# Patient Record
Sex: FEMALE | Race: WHITE | NOT HISPANIC OR LATINO | Employment: OTHER | URBAN - METROPOLITAN AREA
[De-identification: names, ages, dates, MRNs, and addresses within clinical notes are randomized per-mention and may not be internally consistent; named-entity substitution may affect disease eponyms.]

---

## 2020-06-22 PROBLEM — I10 ESSENTIAL HYPERTENSION: Status: ACTIVE | Noted: 2019-03-06

## 2021-12-02 PROBLEM — K21.9 GASTROESOPHAGEAL REFLUX DISEASE WITHOUT ESOPHAGITIS: Status: ACTIVE | Noted: 2021-12-02

## 2021-12-02 PROBLEM — K44.9 HIATAL HERNIA: Status: ACTIVE | Noted: 2021-12-02

## 2021-12-02 PROBLEM — J45.909 CHRONIC ASTHMA WITHOUT COMPLICATION: Status: ACTIVE | Noted: 2021-12-02

## 2023-10-30 PROBLEM — I10 ESSENTIAL HYPERTENSION: Status: RESOLVED | Noted: 2019-03-06 | Resolved: 2023-10-30

## 2023-10-30 PROBLEM — K44.9 HIATAL HERNIA: Status: RESOLVED | Noted: 2021-12-02 | Resolved: 2023-10-30

## 2023-10-30 PROBLEM — E78.5 HYPERLIPIDEMIA: Status: ACTIVE | Noted: 2023-10-30

## 2023-10-30 PROBLEM — M81.0 OSTEOPOROSIS: Status: ACTIVE | Noted: 2023-10-30

## 2024-03-06 ENCOUNTER — OFFICE VISIT (OUTPATIENT)
Dept: RHEUMATOLOGY | Facility: CLINIC | Age: 69
End: 2024-03-06
Payer: MEDICARE

## 2024-03-06 ENCOUNTER — LAB VISIT (OUTPATIENT)
Dept: LAB | Facility: HOSPITAL | Age: 69
End: 2024-03-06
Attending: STUDENT IN AN ORGANIZED HEALTH CARE EDUCATION/TRAINING PROGRAM
Payer: MEDICARE

## 2024-03-06 VITALS
SYSTOLIC BLOOD PRESSURE: 145 MMHG | HEIGHT: 62 IN | BODY MASS INDEX: 28.61 KG/M2 | HEART RATE: 88 BPM | DIASTOLIC BLOOD PRESSURE: 86 MMHG | WEIGHT: 155.44 LBS

## 2024-03-06 DIAGNOSIS — M81.0 OSTEOPOROSIS, UNSPECIFIED OSTEOPOROSIS TYPE, UNSPECIFIED PATHOLOGICAL FRACTURE PRESENCE: Primary | ICD-10-CM

## 2024-03-06 DIAGNOSIS — M81.0 OSTEOPOROSIS, UNSPECIFIED OSTEOPOROSIS TYPE, UNSPECIFIED PATHOLOGICAL FRACTURE PRESENCE: ICD-10-CM

## 2024-03-06 LAB
25(OH)D3+25(OH)D2 SERPL-MCNC: 32 NG/ML (ref 30–96)
ALBUMIN SERPL BCP-MCNC: 2.9 G/DL (ref 3.5–5.2)
ALP SERPL-CCNC: 174 U/L (ref 55–135)
ALT SERPL W/O P-5'-P-CCNC: 22 U/L (ref 10–44)
ANION GAP SERPL CALC-SCNC: 6 MMOL/L (ref 8–16)
AST SERPL-CCNC: 29 U/L (ref 10–40)
BILIRUB SERPL-MCNC: 0.3 MG/DL (ref 0.1–1)
BUN SERPL-MCNC: 17 MG/DL (ref 8–23)
CALCIUM SERPL-MCNC: 8.7 MG/DL (ref 8.7–10.5)
CHLORIDE SERPL-SCNC: 101 MMOL/L (ref 95–110)
CO2 SERPL-SCNC: 28 MMOL/L (ref 23–29)
CREAT SERPL-MCNC: 1.1 MG/DL (ref 0.5–1.4)
EST. GFR  (NO RACE VARIABLE): 54 ML/MIN/1.73 M^2
GLUCOSE SERPL-MCNC: 98 MG/DL (ref 70–110)
POTASSIUM SERPL-SCNC: 4.8 MMOL/L (ref 3.5–5.1)
PROT SERPL-MCNC: 8.2 G/DL (ref 6–8.4)
SODIUM SERPL-SCNC: 135 MMOL/L (ref 136–145)

## 2024-03-06 PROCEDURE — 99205 OFFICE O/P NEW HI 60 MIN: CPT | Mod: S$PBB,,, | Performed by: STUDENT IN AN ORGANIZED HEALTH CARE EDUCATION/TRAINING PROGRAM

## 2024-03-06 PROCEDURE — 99999 PR PBB SHADOW E&M-EST. PATIENT-LVL IV: CPT | Mod: PBBFAC,,, | Performed by: STUDENT IN AN ORGANIZED HEALTH CARE EDUCATION/TRAINING PROGRAM

## 2024-03-06 PROCEDURE — 82306 VITAMIN D 25 HYDROXY: CPT | Performed by: STUDENT IN AN ORGANIZED HEALTH CARE EDUCATION/TRAINING PROGRAM

## 2024-03-06 PROCEDURE — 80053 COMPREHEN METABOLIC PANEL: CPT | Performed by: STUDENT IN AN ORGANIZED HEALTH CARE EDUCATION/TRAINING PROGRAM

## 2024-03-06 PROCEDURE — 99214 OFFICE O/P EST MOD 30 MIN: CPT | Mod: PBBFAC | Performed by: STUDENT IN AN ORGANIZED HEALTH CARE EDUCATION/TRAINING PROGRAM

## 2024-03-06 PROCEDURE — 36415 COLL VENOUS BLD VENIPUNCTURE: CPT | Performed by: STUDENT IN AN ORGANIZED HEALTH CARE EDUCATION/TRAINING PROGRAM

## 2024-03-06 RX ORDER — PREGABALIN 25 MG/1
25 CAPSULE ORAL 2 TIMES DAILY
COMMUNITY

## 2024-03-06 RX ORDER — PANTOPRAZOLE SODIUM 40 MG/1
40 TABLET, DELAYED RELEASE ORAL
COMMUNITY
Start: 2024-02-20 | End: 2024-05-20

## 2024-03-07 NOTE — PROGRESS NOTES
CMP does show some decline in kidney function from previous.  Otherwise stable.  Vitamin-D is normal.

## 2024-03-08 RX ORDER — ACETAMINOPHEN 325 MG/1
650 TABLET ORAL
Status: CANCELLED | OUTPATIENT
Start: 2024-03-08

## 2024-03-08 RX ORDER — ZOLEDRONIC ACID 5 MG/100ML
5 INJECTION, SOLUTION INTRAVENOUS
Status: CANCELLED | OUTPATIENT
Start: 2024-03-08

## 2024-03-08 RX ORDER — HEPARIN 100 UNIT/ML
500 SYRINGE INTRAVENOUS
Status: CANCELLED | OUTPATIENT
Start: 2024-03-08

## 2024-03-08 RX ORDER — SODIUM CHLORIDE 0.9 % (FLUSH) 0.9 %
10 SYRINGE (ML) INJECTION
Status: CANCELLED | OUTPATIENT
Start: 2024-03-08

## 2024-03-27 ENCOUNTER — TELEPHONE (OUTPATIENT)
Dept: INFUSION THERAPY | Facility: HOSPITAL | Age: 69
End: 2024-03-27
Payer: MEDICARE

## 2024-03-28 ENCOUNTER — TELEPHONE (OUTPATIENT)
Dept: RHEUMATOLOGY | Facility: CLINIC | Age: 69
End: 2024-03-28
Payer: MEDICARE

## 2024-03-28 NOTE — TELEPHONE ENCOUNTER
Discussed reclast infusion protocol. Pt scheduled per her request for labs on 5/2 and infusion on 5/09.

## 2024-03-28 NOTE — TELEPHONE ENCOUNTER
----- Message from Ember Castaneda MA sent at 3/27/2024  3:15 PM CDT -----  Contact: Fe    ----- Message -----  From: Lillie Alfredo  Sent: 3/27/2024   3:05 PM CDT  To: Walter [facility] Staff    Type:  Patient Returning Call    Who Called Fe  Who Left Message for Patient:PRANAV MARTINEZ  Does the patient know what this is regarding?:Appointment   Would the patient rather a call back or a response via [facility]? Call back  Best Call Back Number:[phone]  Additional Information:

## 2024-04-03 ENCOUNTER — TELEPHONE (OUTPATIENT)
Dept: RHEUMATOLOGY | Facility: CLINIC | Age: 69
End: 2024-04-03
Payer: MEDICARE

## 2024-04-03 ENCOUNTER — PATIENT MESSAGE (OUTPATIENT)
Dept: INFUSION THERAPY | Facility: HOSPITAL | Age: 69
End: 2024-04-03
Payer: MEDICARE

## 2024-04-03 DIAGNOSIS — M81.0 OSTEOPOROSIS, UNSPECIFIED OSTEOPOROSIS TYPE, UNSPECIFIED PATHOLOGICAL FRACTURE PRESENCE: Primary | ICD-10-CM

## 2024-04-03 NOTE — TELEPHONE ENCOUNTER
----- Message from Tigist Gore RN sent at 4/3/2024 12:54 PM CDT -----  Regarding: Dexa  Hi Dr Corrales,   You saw this new pt on 3/06. I called to schedule her reclast infusion and she asked when she will have a dexa scan done. I didn't see an order. Pt says it has been > 5yrs since her last.  She is scheduled for a cmp on 5/2 and reclast on 5/09 per her request. Looks like your progress note is still open. I didn't know if you were waiting on Medical  documents. Are you ready for me to schedule her infusion or do I need to wait? thanks

## 2024-04-08 NOTE — PROGRESS NOTES
RHEUMATOLOGY CLINIC INITIAL VISIT    Reason for consult:- osteoporosis    Chief complaints, HPI, ROS, EXAM, Assessment & Plans:-    Fe Irene is a 68 y.o. pleasant female who presents to be evaluated for management of osteoporosis.  She has had multiple fragility fractures of her tibia and fibula in the past.  She is taking vitamin-D.  No plans for invasive dental work.  Rheumatologic review of systems otherwise negative.  Physical exam is unremarkable.    Reviewed all available old and outside pertinent medical records.    All lab results personally reviewed and interpreted by me.    1. Osteoporosis, unspecified osteoporosis type, unspecified pathological fracture presence        Problem List Items Addressed This Visit          Endocrine    Osteoporosis - Primary    Relevant Orders    Comprehensive Metabolic Panel    Vitamin D       Patient presenting to be evaluated for treatment of osteoporosis  DEXA scan  showed osteoporosis with a T-score of -2.9 at the left femoral neck  In light of this and her fragility fractures she is very high risk for fracture  Therefore we will plan to treat with Reclast yearly  Update DEXA scan  Check CMP and vitamin-D    # Follow up in about 1 year (around 3/6/2025).    Chronic comorbid conditions affecting medical decision making today:    Past Medical History:   Diagnosis Date    Asthma     Gastroesophageal reflux disease without esophagitis 2021    Hyperlipidemia 10/30/2023    Hypertension     Osteoporosis 10/30/2023       Past Surgical History:   Procedure Laterality Date    HYSTERECTOMY      SHOULDER SURGERY Bilateral     TONSILLECTOMY          Social History     Tobacco Use    Smoking status: Former     Current packs/day: 0.00     Types: Cigarettes     Quit date:      Years since quittin.2    Smokeless tobacco: Never   Substance Use Topics    Alcohol use: Yes     Comment: rare       History reviewed. No pertinent family history.    Review of patient's  allergies indicates:   Allergen Reactions    Codeine        Medication List with Changes/Refills   Current Medications    ALBUTEROL (PROVENTIL/VENTOLIN HFA) 90 MCG/ACTUATION INHALER    Inhale 2 puffs into the lungs every 6 (six) hours as needed for Wheezing.    B COMPLEX VITAMINS CAPSULE        FLUTICASONE FUROATE-VILANTEROL (BREO ELLIPTA) 100-25 MCG/DOSE DISKUS INHALER    Inhale 1 puff into the lungs once daily. Controller    FLUTICASONE PROPIONATE (FLONASE) 50 MCG/ACTUATION NASAL SPRAY        OMEPRAZOLE (PRILOSEC) 40 MG CAPSULE    Take by mouth.    PANTOPRAZOLE (PROTONIX) 40 MG TABLET    Take 40 mg by mouth.    PREGABALIN (LYRICA) 25 MG CAPSULE    Take 25 mg by mouth 2 (two) times daily.    TRAMADOL (ULTRAM) 50 MG TABLET    Take 1 tablet by mouth every 6 (six) hours as needed.         Disclaimer: This note was prepared using voice recognition system and is likely to have sound alike errors and is not proofread.  Please message me with any questions.    62 minutes of total time spent on the encounter, which includes face to face time and non-face to face time preparing to see the patient (eg, review of tests), Obtaining and/or reviewing separately obtained history, Documenting clinical information in the electronic or other health record, Independently interpreting results (not separately reported) and communicating results to the patient/family/caregiver, or Care coordination (not separately reported).     Thank you for allowing me to participate in the care of Fe Irene.    Rao Watson MD

## 2024-04-29 ENCOUNTER — HOSPITAL ENCOUNTER (EMERGENCY)
Facility: HOSPITAL | Age: 69
Discharge: HOME | End: 2024-04-29
Attending: STUDENT IN AN ORGANIZED HEALTH CARE EDUCATION/TRAINING PROGRAM
Payer: MEDICARE

## 2024-04-29 VITALS
SYSTOLIC BLOOD PRESSURE: 174 MMHG | HEART RATE: 64 BPM | DIASTOLIC BLOOD PRESSURE: 93 MMHG | BODY MASS INDEX: 28.44 KG/M2 | RESPIRATION RATE: 18 BRPM | HEIGHT: 62 IN | OXYGEN SATURATION: 98 % | WEIGHT: 154.56 LBS | TEMPERATURE: 98 F

## 2024-04-29 DIAGNOSIS — J06.9 VIRAL URI WITH COUGH: Primary | ICD-10-CM

## 2024-04-29 LAB
FLUAV AG UPPER RESP QL IA.RAPID: NOT DETECTED
FLUBV AG UPPER RESP QL IA.RAPID: NOT DETECTED
SARS-COV-2 RNA RESP QL NAA+PROBE: NOT DETECTED

## 2024-04-29 PROCEDURE — 0240U COVID/FLU A&B PCR: CPT | Performed by: PHYSICIAN ASSISTANT

## 2024-04-29 PROCEDURE — 25000003 PHARM REV CODE 250: Performed by: PHYSICIAN ASSISTANT

## 2024-04-29 PROCEDURE — 99284 EMERGENCY DEPT VISIT MOD MDM: CPT | Mod: 25

## 2024-04-29 RX ORDER — BENZONATATE 100 MG/1
200 CAPSULE ORAL
Status: COMPLETED | OUTPATIENT
Start: 2024-04-29 | End: 2024-04-29

## 2024-04-29 RX ORDER — BENZONATATE 100 MG/1
100 CAPSULE ORAL 3 TIMES DAILY PRN
Qty: 20 CAPSULE | Refills: 0 | Status: SHIPPED | OUTPATIENT
Start: 2024-04-29 | End: 2024-05-09

## 2024-04-29 RX ORDER — BUTALBITAL, ACETAMINOPHEN AND CAFFEINE 50; 325; 40 MG/1; MG/1; MG/1
1 TABLET ORAL
Status: COMPLETED | OUTPATIENT
Start: 2024-04-29 | End: 2024-04-29

## 2024-04-29 RX ORDER — METHYLPREDNISOLONE 4 MG/1
TABLET ORAL
Qty: 21 EACH | Refills: 0 | Status: SHIPPED | OUTPATIENT
Start: 2024-04-29 | End: 2024-05-20

## 2024-04-29 RX ADMIN — BENZONATATE 200 MG: 100 CAPSULE ORAL at 04:04

## 2024-04-29 RX ADMIN — BUTALBITAL, ACETAMINOPHEN, AND CAFFEINE 1 TABLET: 325; 50; 40 TABLET ORAL at 04:04

## 2024-04-29 NOTE — ED PROVIDER NOTES
Encounter Date: 2024       History     Chief Complaint   Patient presents with    Headache     CARLOS x 2 days     Fe Irene is a 68 y.o. female with a history of HTN, HLD, GERD, and asthma who presents to the ED complaining of generalized body aches, headache, and cough x 3 days. Reportss has a chronic cough, however it has worsened over the last few days. Has been taking tylenol which is helping her headaches. Denies fevers, chills, neck stiffness, dizziness, abdominal pain, N/V.    The history is provided by the patient.     Review of patient's allergies indicates:   Allergen Reactions    Codeine      Past Medical History:   Diagnosis Date    Asthma     Gastroesophageal reflux disease without esophagitis 2021    Hyperlipidemia 10/30/2023    Hypertension     Osteoporosis 10/30/2023     Past Surgical History:   Procedure Laterality Date    HYSTERECTOMY      SHOULDER SURGERY Bilateral     TONSILLECTOMY       No family history on file.  Social History     Tobacco Use    Smoking status: Former     Current packs/day: 0.00     Types: Cigarettes     Quit date:      Years since quittin.3    Smokeless tobacco: Never   Substance Use Topics    Alcohol use: Yes     Comment: rare     Review of Systems   Constitutional:  Negative for chills and fever.   HENT:  Positive for congestion. Negative for sore throat.    Eyes:  Negative for redness and itching.   Respiratory:  Positive for cough. Negative for shortness of breath.    Cardiovascular:  Negative for chest pain.   Gastrointestinal:  Negative for abdominal pain and nausea.   Genitourinary:  Negative for dysuria and frequency.   Musculoskeletal:  Positive for myalgias. Negative for back pain.   Skin:  Negative for rash.   Neurological:  Positive for headaches. Negative for weakness.   Hematological:  Does not bruise/bleed easily.       Physical Exam     Initial Vitals [24 1541]   BP Pulse Resp Temp SpO2   (!) 164/89 82 18 98.1 °F (36.7 °C) 99 %       MAP       --         Physical Exam    Nursing note and vitals reviewed.  Constitutional: She appears well-developed and well-nourished. No distress.   HENT:   Head: Normocephalic and atraumatic.   Mouth/Throat: No oropharyngeal exudate.   Eyes: EOM are normal. No scleral icterus.   Neck: Neck supple.   Normal range of motion.  Cardiovascular:  Normal rate and regular rhythm.           No murmur heard.  Pulmonary/Chest: Breath sounds normal. No respiratory distress. She has no wheezes.   Abdominal: Abdomen is soft. Bowel sounds are normal. She exhibits no distension. There is no abdominal tenderness.   Musculoskeletal:         General: No tenderness. Normal range of motion.      Cervical back: Normal range of motion and neck supple.     Neurological: She is alert and oriented to person, place, and time. No cranial nerve deficit.   Skin: Skin is warm and dry. Capillary refill takes less than 2 seconds. No erythema.   Psychiatric: She has a normal mood and affect. Her behavior is normal. Judgment and thought content normal.         ED Course   Procedures  Labs Reviewed   COVID/FLU A&B PCR - Normal    Narrative:     The Xpert Xpress SARS-CoV-2/FLU/RSV plus is a rapid, multiplexed real-time PCR test intended for the simultaneous qualitative detection and differentiation of SARS-CoV-2, Influenza A, Influenza B, and respiratory syncytial virus (RSV) viral RNA in either nasopharyngeal swab or nasal swab specimens.                Imaging Results              X-Ray Chest PA And Lateral (Final result)  Result time 04/29/24 16:28:21      Final result by Antoni Og MD (04/29/24 16:28:21)                   Impression:      Left lower lung lobe consolidation and associated pleural effusion.      Electronically signed by: Antoni Og  Date:    04/29/2024  Time:    16:28               Narrative:    EXAMINATION:  XR CHEST PA AND LATERAL    CLINICAL HISTORY:  cough;    TECHNIQUE:  Two-view    COMPARISON:  None  available.    FINDINGS:  Cardiopericardial silhouette is within normal limits.  Consolidation involves the left lower lung lobe and there is associated small pleural effusion.  There is no pulmonary edema.  No pneumothorax.                                       Medications   butalbital-acetaminophen-caffeine -40 mg per tablet 1 tablet (1 tablet Oral Given 4/29/24 1610)   benzonatate capsule 200 mg (200 mg Oral Given 4/29/24 1610)     Medical Decision Making  Differential: viral syndrome, sinusitis, pneumonia, among others    ED management: HDS and afebrile. Lungs CTA bilaterally. PERRL. Full ROM of neck without pain. COVID/flu negative. CXR with chronic left lower lung nodule, no other consolidation. Suspect viral vs allergies. Stable for discharge home with medrol dose pack. Instructed to follow up with PCP in 3 days. ED return precautions given. She verbalized understanding. All questions answered.     Amount and/or Complexity of Data Reviewed  Radiology: ordered. Decision-making details documented in ED Course.    Risk  Prescription drug management.               ED Course as of 04/29/24 1735   Mon Apr 29, 2024   1635 X-Ray Chest PA And Lateral  Left lower lung lobe consolidation and associated pleural effusion.  [KD]   1635 Pt states left lower lung consolidation is chronic. She has had multiple Cts and been treated multiple times with antibiotics.  [KD]      ED Course User Index  [KD] Ashly Cho PA-C                           Clinical Impression:  Final diagnoses:  [J06.9] Viral URI with cough (Primary)          ED Disposition Condition    Discharge Stable          ED Prescriptions       Medication Sig Dispense Start Date End Date Auth. Provider    methylPREDNISolone (MEDROL DOSEPACK) 4 mg tablet use as directed 21 each 4/29/2024 5/20/2024 Ashly Cho PA-C    benzonatate (TESSALON) 100 MG capsule Take 1 capsule (100 mg total) by mouth 3 (three) times daily as needed for Cough. 20  capsule 4/29/2024 5/9/2024 Ashly Cho PA-C          Follow-up Information       Follow up With Specialties Details Why Contact Info    [facility] Emergency Medicine  If symptoms worsen [address] [facility] [address] [phone]    Dwight Galarza MD Family Medicine In 3 days Hospital follow up [address] [facility]  [address]  [facility] [id number] [phone]               Ashly Cho PA-C  04/29/24 1737

## 2024-05-08 ENCOUNTER — PATIENT MESSAGE (OUTPATIENT)
Dept: RHEUMATOLOGY | Facility: CLINIC | Age: 69
End: 2024-05-08
Payer: MEDICARE

## 2024-05-09 ENCOUNTER — INFUSION (OUTPATIENT)
Dept: INFUSION THERAPY | Facility: HOSPITAL | Age: 69
End: 2024-05-09
Attending: STUDENT IN AN ORGANIZED HEALTH CARE EDUCATION/TRAINING PROGRAM
Payer: MEDICARE

## 2024-05-09 VITALS
TEMPERATURE: 98 F | RESPIRATION RATE: 16 BRPM | SYSTOLIC BLOOD PRESSURE: 135 MMHG | OXYGEN SATURATION: 99 % | HEART RATE: 62 BPM | DIASTOLIC BLOOD PRESSURE: 67 MMHG

## 2024-05-09 DIAGNOSIS — M81.0 OSTEOPOROSIS, UNSPECIFIED OSTEOPOROSIS TYPE, UNSPECIFIED PATHOLOGICAL FRACTURE PRESENCE: Primary | ICD-10-CM

## 2024-05-09 PROCEDURE — 96374 THER/PROPH/DIAG INJ IV PUSH: CPT

## 2024-05-09 PROCEDURE — 63600175 PHARM REV CODE 636 W HCPCS: Performed by: STUDENT IN AN ORGANIZED HEALTH CARE EDUCATION/TRAINING PROGRAM

## 2024-05-09 RX ORDER — SODIUM CHLORIDE 0.9 % (FLUSH) 0.9 %
10 SYRINGE (ML) INJECTION
OUTPATIENT
Start: 2024-05-09

## 2024-05-09 RX ORDER — ZOLEDRONIC ACID 5 MG/100ML
5 INJECTION, SOLUTION INTRAVENOUS
OUTPATIENT
Start: 2024-05-09

## 2024-05-09 RX ORDER — ACETAMINOPHEN 325 MG/1
650 TABLET ORAL
OUTPATIENT
Start: 2024-05-09

## 2024-05-09 RX ORDER — ZOLEDRONIC ACID 5 MG/100ML
5 INJECTION, SOLUTION INTRAVENOUS
Status: COMPLETED | OUTPATIENT
Start: 2024-05-09 | End: 2024-05-09

## 2024-05-09 RX ORDER — HEPARIN 100 UNIT/ML
500 SYRINGE INTRAVENOUS
OUTPATIENT
Start: 2024-05-09

## 2024-05-09 RX ADMIN — ZOLEDRONIC ACID 5 MG: 5 INJECTION, SOLUTION INTRAVENOUS at 11:05

## 2024-05-09 NOTE — PLAN OF CARE
Patient tolerated Reclast well today; no adverse reaction noted.  POC reviewed with pt.  NAD noted upon discharge.   Has f/u appt(s) scheduled per MD request.    Problem: Adult Inpatient Plan of Care  Goal: Plan of Care Review  Outcome: Progressing  Goal: Patient-Specific Goal (Individualized)  Outcome: Progressing  Goal: Absence of Hospital-Acquired Illness or Injury  Outcome: Progressing  Intervention: Identify and Manage Fall Risk  Flowsheets (Taken 5/9/2024 1133)  Safety Promotion/Fall Prevention: in recliner, wheels locked  Goal: Optimal Comfort and Wellbeing  Outcome: Progressing  Intervention: Provide Person-Centered Care  Flowsheets (Taken 5/9/2024 1133)  Trust Relationship/Rapport:   care explained   reassurance provided   choices provided   thoughts/feelings acknowledged   emotional support provided   empathic listening provided   questions encouraged   questions answered

## 2024-05-20 ENCOUNTER — TELEPHONE (OUTPATIENT)
Dept: RHEUMATOLOGY | Facility: CLINIC | Age: 69
End: 2024-05-20
Payer: MEDICARE

## 2024-05-20 NOTE — TELEPHONE ENCOUNTER
Patient was notified of lab/imaging results per provider. Patient verbalized understanding. All questions answered.  Patient was grateful for the call. -ELKIN

## 2024-08-01 PROBLEM — D50.9 IRON DEFICIENCY ANEMIA: Status: ACTIVE | Noted: 2024-08-01

## 2024-08-06 ENCOUNTER — TELEPHONE (OUTPATIENT)
Dept: HEMATOLOGY/ONCOLOGY | Facility: CLINIC | Age: 69
End: 2024-08-06
Payer: MEDICARE

## 2024-08-26 ENCOUNTER — OFFICE VISIT (OUTPATIENT)
Dept: HEMATOLOGY/ONCOLOGY | Facility: CLINIC | Age: 69
End: 2024-08-26
Payer: MEDICARE

## 2024-08-26 DIAGNOSIS — D50.8 IRON DEFICIENCY ANEMIA SECONDARY TO INADEQUATE DIETARY IRON INTAKE: Primary | ICD-10-CM

## 2024-08-26 PROCEDURE — 99204 OFFICE O/P NEW MOD 45 MIN: CPT | Mod: 95,,, | Performed by: INTERNAL MEDICINE

## 2024-08-26 RX ORDER — HEPARIN 100 UNIT/ML
500 SYRINGE INTRAVENOUS
OUTPATIENT
Start: 2024-08-26

## 2024-08-26 RX ORDER — DIPHENHYDRAMINE HYDROCHLORIDE 50 MG/ML
50 INJECTION INTRAMUSCULAR; INTRAVENOUS ONCE AS NEEDED
OUTPATIENT
Start: 2024-08-26

## 2024-08-26 RX ORDER — EPINEPHRINE 0.3 MG/.3ML
0.3 INJECTION SUBCUTANEOUS ONCE AS NEEDED
OUTPATIENT
Start: 2024-08-26

## 2024-08-26 RX ORDER — SODIUM CHLORIDE 0.9 % (FLUSH) 0.9 %
10 SYRINGE (ML) INJECTION
OUTPATIENT
Start: 2024-08-26

## 2024-08-26 NOTE — PROGRESS NOTES
The patient location is: home  Visit type: Virtual visit with synchronous audio and video  Face-to-face or time spent with patient on the encounter: 25 min  Total time spent on and for  this encounter which includes non face-to-face time preparing to see patient, review of tests, obtaining and or reviewing separately obtained records documenting clinical information in the electronic or other health records, independently interpreting results which is not separately reported ,and communicating results to the patient/family/caregiver and in care coordination and treatment planning/communicating with pharmacy for prescriptions/addressing social needs/arranging follow-up and or referrals : 25 min     Each patient I provide medical services by telemedicine is:  (1) informed of the relationship between the physician and patient and the respective role of any other health care provider with respect to management of the patient; and (2) notified that he or she may decline to receive medical services by telemedicine and may withdraw from such care at any time.  This is a video visit therefore some elements of the physical exam such as vital signs, heart sounds are breath sounds are not included and may be included if found in recent clinic notes of other providers assessing same patient. Any symptoms or signs that were visualized were stated by the patient may be included in this note.      Service Date:  8/26/24    Chief Complaint: iron deficiency anemia    Fe Irene is a 69 y.o. female here with iron deficiency anemia.  Patient has a Cologuard test pending.  Denies any blood loss in stool that is visible.  Had a colonoscopy about 1.5 years ago according to her in [place].  Complaining of fatigue.  Has tried oral iron but gets diarrhea.    Review of Systems   Constitutional: Negative.  Negative for appetite change and unexpected weight change.   HENT:  Positive for mouth sores.    Eyes: Negative.  Negative for  visual disturbance.   Respiratory:  Positive for cough. Negative for shortness of breath.    Cardiovascular: Negative.  Negative for chest pain.   Gastrointestinal: Negative.  Negative for abdominal pain and diarrhea.   Endocrine: Negative.    Genitourinary: Negative.  Negative for frequency.   Musculoskeletal:  Positive for back pain.   Integumentary:  Negative for rash. Negative.   Neurological:  Positive for headaches.   Hematological: Negative.  Negative for adenopathy.   Psychiatric/Behavioral: Negative.  The patient is not nervous/anxious.         Current Outpatient Medications   Medication Instructions    albuterol (PROVENTIL/VENTOLIN HFA) 90 mcg/actuation inhaler 2 puffs, Inhalation, Every 6 hours PRN    b complex vitamins capsule No dose, route, or frequency recorded.    ibuprofen (ADVIL,MOTRIN) 800 mg, Oral, Every 6-8 hours PRN    losartan (COZAAR) 25 mg, Oral, Daily    pantoprazole (PROTONIX) 40 mg, Oral    pregabalin (LYRICA) 100 mg, Oral, 2 times daily    traMADoL (ULTRAM) 50 mg tablet 1 tablet, Oral, Every 6 hours PRN    vitamin D (VITAMIN D3) 1000 units Tab 1 tablet, Oral, Every morning        Past Medical History:   Diagnosis Date    Asthma     Gastroesophageal reflux disease without esophagitis 2021    Hyperlipidemia 10/30/2023    Hypertension     Osteoporosis 10/30/2023        Past Surgical History:   Procedure Laterality Date    HYSTERECTOMY      SHOULDER SURGERY Bilateral     TONSILLECTOMY          No family history on file.    Social History     Tobacco Use    Smoking status: Former     Current packs/day: 0.00     Types: Cigarettes     Quit date:      Years since quittin.6    Smokeless tobacco: Never   Substance Use Topics    Alcohol use: Yes     Comment: rare         There were no vitals filed for this visit.     Physical Exam:  There were no vitals taken for this visit.    Physical Exam  Constitutional:       Appearance: Normal appearance.   HENT:      Head: Normocephalic and  atraumatic.      Nose: Nose normal.      Mouth/Throat:      Mouth: Mucous membranes are moist.      Pharynx: Oropharynx is clear.   Eyes:      Conjunctiva/sclera: Conjunctivae normal.   Cardiovascular:      Rate and Rhythm: Normal rate and regular rhythm.      Heart sounds: Normal heart sounds.   Pulmonary:      Effort: Pulmonary effort is normal.      Breath sounds: Normal breath sounds.   Abdominal:      General: Abdomen is flat. Bowel sounds are normal.      Palpations: Abdomen is soft.   Musculoskeletal:         General: Normal range of motion.      Cervical back: Normal range of motion and neck supple.   Skin:     General: Skin is warm and dry.   Neurological:      General: No focal deficit present.      Mental Status: She is alert and oriented to person, place, and time. Mental status is at baseline.   Psychiatric:         Mood and Affect: Mood normal.          Labs:  Lab Results   Component Value Date    WBC 13.1 (H) 08/01/2024    RBC 4.82 08/01/2024    HGB 10.7 (L) 08/01/2024    HCT 36.1 08/01/2024    MCV 75 (L) 08/01/2024    MCH 22.2 (L) 08/01/2024    MCHC 29.6 (L) 08/01/2024    RDW 17.5 (H) 08/01/2024     08/01/2024    LYMPH 2.7 08/01/2024    MONO 4 08/01/2024    MONO 0.5 08/01/2024    EOS 0.1 08/01/2024    BASO 0.1 08/01/2024    EOSINOPHIL 1 08/01/2024    BASOPHIL 1 08/01/2024     Sodium   Date Value Ref Range Status   08/15/2024 139 134 - 144 mmol/L Final     Potassium   Date Value Ref Range Status   08/15/2024 5.1 3.5 - 5.2 mmol/L Final     Chloride   Date Value Ref Range Status   08/15/2024 103 96 - 106 mmol/L Final     CO2   Date Value Ref Range Status   08/15/2024 20 20 - 29 mmol/L Final     Glucose   Date Value Ref Range Status   08/15/2024 69 (L) 70 - 99 mg/dL Final     BUN   Date Value Ref Range Status   08/15/2024 23 8 - 27 mg/dL Final     Creatinine   Date Value Ref Range Status   08/15/2024 1.02 (H) 0.57 - 1.00 mg/dL Final     Calcium   Date Value Ref Range Status   08/15/2024 8.8 8.7 -  10.3 mg/dL Final     Total Protein   Date Value Ref Range Status   05/09/2024 7.7 6.0 - 8.4 g/dL Final     Albumin   Date Value Ref Range Status   08/01/2024 4.3 3.8 - 4.8 g/dL Final   05/09/2024 2.8 (L) 3.5 - 5.2 g/dL Final     Total Bilirubin   Date Value Ref Range Status   08/01/2024 0.4 0.0 - 1.2 mg/dL Final     Alkaline Phosphatase   Date Value Ref Range Status   05/09/2024 131 55 - 135 U/L Final     AST   Date Value Ref Range Status   08/01/2024 28 0 - 40 IU/L Final     ALT   Date Value Ref Range Status   08/01/2024 18 0 - 32 IU/L Final     Anion Gap   Date Value Ref Range Status   05/09/2024 8 8 - 16 mmol/L Final     eGFR if non    Date Value Ref Range Status   12/16/2021 69 >59 mL/min/1.73 Final       A/P:    Iron deficiency anemia  -may be due to gastric bypass but Cologuard test pending  -will get auth for Ferrlecit and give 8 doses  -RTC 4 weeks after with labs to check if improved      Aurash Khoobehi, MD  Hematology and Oncology

## 2024-08-27 ENCOUNTER — HOSPITAL ENCOUNTER (OUTPATIENT)
Dept: RADIOLOGY | Facility: HOSPITAL | Age: 69
Discharge: HOME | End: 2024-08-27
Attending: NURSE PRACTITIONER
Payer: MEDICARE

## 2024-08-27 DIAGNOSIS — Z12.31 SCREENING MAMMOGRAM FOR BREAST CANCER: ICD-10-CM

## 2024-08-27 PROCEDURE — 77067 SCR MAMMO BI INCL CAD: CPT | Mod: TC

## 2024-08-28 ENCOUNTER — TELEPHONE (OUTPATIENT)
Dept: HEMATOLOGY/ONCOLOGY | Facility: CLINIC | Age: 69
End: 2024-08-28
Payer: MEDICARE

## 2024-08-28 NOTE — TELEPHONE ENCOUNTER
----- Message from Claire Mccain RN sent at 8/28/2024 11:07 AM CDT -----  Regarding: iv iron  Good morning,     Please help with scheduling pt for 8 doses of ferrlecit. Thanks!    PING Rangel

## 2024-09-05 ENCOUNTER — INFUSION (OUTPATIENT)
Dept: INFUSION THERAPY | Facility: HOSPITAL | Age: 69
End: 2024-09-05
Attending: STUDENT IN AN ORGANIZED HEALTH CARE EDUCATION/TRAINING PROGRAM
Payer: MEDICARE

## 2024-09-05 VITALS
OXYGEN SATURATION: 99 % | HEART RATE: 62 BPM | DIASTOLIC BLOOD PRESSURE: 73 MMHG | TEMPERATURE: 97 F | RESPIRATION RATE: 16 BRPM | SYSTOLIC BLOOD PRESSURE: 151 MMHG

## 2024-09-05 DIAGNOSIS — D50.8 IRON DEFICIENCY ANEMIA SECONDARY TO INADEQUATE DIETARY IRON INTAKE: Primary | ICD-10-CM

## 2024-09-05 PROCEDURE — 25000003 PHARM REV CODE 250: Performed by: INTERNAL MEDICINE

## 2024-09-05 PROCEDURE — 63600175 PHARM REV CODE 636 W HCPCS: Performed by: INTERNAL MEDICINE

## 2024-09-05 PROCEDURE — 96365 THER/PROPH/DIAG IV INF INIT: CPT

## 2024-09-05 RX ORDER — SODIUM CHLORIDE 0.9 % (FLUSH) 0.9 %
10 SYRINGE (ML) INJECTION
OUTPATIENT
Start: 2024-09-12

## 2024-09-05 RX ORDER — DIPHENHYDRAMINE HYDROCHLORIDE 50 MG/ML
50 INJECTION INTRAMUSCULAR; INTRAVENOUS ONCE AS NEEDED
OUTPATIENT
Start: 2024-09-12

## 2024-09-05 RX ORDER — HEPARIN 100 UNIT/ML
500 SYRINGE INTRAVENOUS
OUTPATIENT
Start: 2024-09-12

## 2024-09-05 RX ORDER — EPINEPHRINE 0.3 MG/.3ML
0.3 INJECTION SUBCUTANEOUS ONCE AS NEEDED
OUTPATIENT
Start: 2024-09-12

## 2024-09-05 RX ADMIN — SODIUM CHLORIDE: 9 INJECTION, SOLUTION INTRAVENOUS at 11:09

## 2024-09-05 RX ADMIN — SODIUM CHLORIDE 125 MG: 9 INJECTION, SOLUTION INTRAVENOUS at 11:09

## 2024-09-05 NOTE — PLAN OF CARE
Problem: Adult Inpatient Plan of Care  Goal: Plan of Care Review  Outcome: Progressing  Flowsheets (Taken 9/5/2024 1417)  Plan of Care Reviewed With: patient  Goal: Patient-Specific Goal (Individualized)  Outcome: Progressing  Flowsheets (Taken 9/5/2024 1417)  Individualized Care Needs: Legs up, warm blankets, pillow for head and arm with IV. Likes IV in left arm  Anxieties, Fears or Concerns: No concerns expressed. it is her first time getting IV iron  Patient/Family-Specific Goals (Include Timeframe): Tolerated first dose of ferrlecit  Goal: Optimal Comfort and Wellbeing  Outcome: Progressing  Intervention: Monitor Pain and Promote Comfort  Flowsheets (Taken 9/5/2024 1417)  Pain Management Interventions:   warm blanket provided   quiet environment facilitated  Intervention: Provide Person-Centered Care  Flowsheets (Taken 9/5/2024 1417)  Trust Relationship/Rapport:   care explained   choices provided   thoughts/feelings acknowledged   reassurance provided   emotional support provided   empathic listening provided   questions answered   questions encouraged     Problem: Anemia  Goal: Anemia Symptom Improvement  Outcome: Progressing  Intervention: Monitor and Manage Anemia  Flowsheets (Taken 9/5/2024 1417)  Safety Promotion/Fall Prevention:   medications reviewed   lighting adjusted   instructed to call staff for mobility   in recliner, wheels locked

## 2024-09-12 ENCOUNTER — TELEPHONE (OUTPATIENT)
Dept: INFUSION THERAPY | Facility: HOSPITAL | Age: 69
End: 2024-09-12
Payer: MEDICARE

## 2024-09-12 NOTE — TELEPHONE ENCOUNTER
Spoke with patient and advised her not to come in due to running on a generator. Will call back to reschedule. Patient is aware.

## 2024-09-19 ENCOUNTER — INFUSION (OUTPATIENT)
Dept: INFUSION THERAPY | Facility: HOSPITAL | Age: 69
End: 2024-09-19
Attending: INTERNAL MEDICINE
Payer: MEDICARE

## 2024-09-19 VITALS
SYSTOLIC BLOOD PRESSURE: 134 MMHG | OXYGEN SATURATION: 99 % | DIASTOLIC BLOOD PRESSURE: 71 MMHG | HEART RATE: 62 BPM | TEMPERATURE: 98 F | RESPIRATION RATE: 18 BRPM

## 2024-09-19 DIAGNOSIS — D50.8 IRON DEFICIENCY ANEMIA SECONDARY TO INADEQUATE DIETARY IRON INTAKE: Primary | ICD-10-CM

## 2024-09-19 PROCEDURE — 25000003 PHARM REV CODE 250: Performed by: INTERNAL MEDICINE

## 2024-09-19 PROCEDURE — 96365 THER/PROPH/DIAG IV INF INIT: CPT

## 2024-09-19 PROCEDURE — 96375 TX/PRO/DX INJ NEW DRUG ADDON: CPT

## 2024-09-19 PROCEDURE — 63600175 PHARM REV CODE 636 W HCPCS: Performed by: INTERNAL MEDICINE

## 2024-09-19 RX ORDER — SODIUM CHLORIDE 0.9 % (FLUSH) 0.9 %
10 SYRINGE (ML) INJECTION
OUTPATIENT
Start: 2024-09-19

## 2024-09-19 RX ORDER — ZOLEDRONIC ACID 5 MG/100ML
5 INJECTION, SOLUTION INTRAVENOUS
OUTPATIENT
Start: 2024-09-19

## 2024-09-19 RX ORDER — DIPHENHYDRAMINE HYDROCHLORIDE 50 MG/ML
50 INJECTION INTRAMUSCULAR; INTRAVENOUS ONCE AS NEEDED
OUTPATIENT
Start: 2024-09-26

## 2024-09-19 RX ORDER — SODIUM CHLORIDE 0.9 % (FLUSH) 0.9 %
10 SYRINGE (ML) INJECTION
OUTPATIENT
Start: 2024-09-26

## 2024-09-19 RX ORDER — ACETAMINOPHEN 325 MG/1
650 TABLET ORAL
OUTPATIENT
Start: 2024-09-19

## 2024-09-19 RX ORDER — HEPARIN 100 UNIT/ML
500 SYRINGE INTRAVENOUS
OUTPATIENT
Start: 2024-09-26

## 2024-09-19 RX ORDER — ACETAMINOPHEN 325 MG/1
650 TABLET ORAL
Status: COMPLETED | OUTPATIENT
Start: 2024-09-19 | End: 2024-09-19

## 2024-09-19 RX ORDER — HEPARIN 100 UNIT/ML
500 SYRINGE INTRAVENOUS
OUTPATIENT
Start: 2024-09-19

## 2024-09-19 RX ORDER — EPINEPHRINE 0.3 MG/.3ML
0.3 INJECTION SUBCUTANEOUS ONCE AS NEEDED
OUTPATIENT
Start: 2024-09-26

## 2024-09-19 RX ADMIN — ACETAMINOPHEN 650 MG: 325 TABLET ORAL at 12:09

## 2024-09-19 RX ADMIN — SODIUM CHLORIDE 125 MG: 9 INJECTION, SOLUTION INTRAVENOUS at 01:09

## 2024-09-19 RX ADMIN — METHYLPREDNISOLONE SODIUM SUCCINATE 80 MG: 125 INJECTION, POWDER, FOR SOLUTION INTRAMUSCULAR; INTRAVENOUS at 12:09

## 2024-09-19 NOTE — NURSING
Pt tolerated Ferrlecit #2 infusion well. No adverse reaction noted. Pt education reinforced on possible side effects, what to expect, and when to call her provider. Pt verbalized understanding. I reviewed pt calendar w/ pt and understanding verbalized. IV flushed w/ NS and D/C per protocol.

## 2024-09-19 NOTE — NURSING
Pt here for second Ferrlecit infusion.  She states after her first Ferrlecit on 9/5/24, a couple of days later she had severe back pain, she passed out, then had multiple episodes of diarrhea.  Relayed this information to Dr. Khoobehi.  Orders for Solumedrl 80mg iv and Tylenol 650mg added to her plan.  Will  monitor.

## 2024-09-26 ENCOUNTER — INFUSION (OUTPATIENT)
Dept: INFUSION THERAPY | Facility: HOSPITAL | Age: 69
End: 2024-09-26
Attending: FAMILY MEDICINE
Payer: MEDICARE

## 2024-09-26 VITALS
HEART RATE: 73 BPM | TEMPERATURE: 98 F | RESPIRATION RATE: 16 BRPM | SYSTOLIC BLOOD PRESSURE: 137 MMHG | DIASTOLIC BLOOD PRESSURE: 72 MMHG | OXYGEN SATURATION: 98 %

## 2024-09-26 DIAGNOSIS — D50.8 IRON DEFICIENCY ANEMIA SECONDARY TO INADEQUATE DIETARY IRON INTAKE: Primary | ICD-10-CM

## 2024-09-26 PROCEDURE — 63600175 PHARM REV CODE 636 W HCPCS: Performed by: INTERNAL MEDICINE

## 2024-09-26 PROCEDURE — 96365 THER/PROPH/DIAG IV INF INIT: CPT

## 2024-09-26 PROCEDURE — 96375 TX/PRO/DX INJ NEW DRUG ADDON: CPT

## 2024-09-26 PROCEDURE — 25000003 PHARM REV CODE 250: Performed by: INTERNAL MEDICINE

## 2024-09-26 RX ORDER — METHYLPREDNISOLONE SOD SUCC 125 MG
80 VIAL (EA) INJECTION
Start: 2024-10-03

## 2024-09-26 RX ORDER — ACETAMINOPHEN 325 MG/1
650 TABLET ORAL
Status: COMPLETED | OUTPATIENT
Start: 2024-09-26 | End: 2024-09-26

## 2024-09-26 RX ORDER — METHYLPREDNISOLONE SOD SUCC 125 MG
80 VIAL (EA) INJECTION
Status: COMPLETED | OUTPATIENT
Start: 2024-09-26 | End: 2024-09-26

## 2024-09-26 RX ORDER — SODIUM CHLORIDE 0.9 % (FLUSH) 0.9 %
10 SYRINGE (ML) INJECTION
OUTPATIENT
Start: 2024-10-03

## 2024-09-26 RX ORDER — ACETAMINOPHEN 325 MG/1
650 TABLET ORAL
Start: 2024-10-03

## 2024-09-26 RX ORDER — HEPARIN 100 UNIT/ML
500 SYRINGE INTRAVENOUS
OUTPATIENT
Start: 2024-10-03

## 2024-09-26 RX ORDER — EPINEPHRINE 0.3 MG/.3ML
0.3 INJECTION SUBCUTANEOUS ONCE AS NEEDED
OUTPATIENT
Start: 2024-10-03

## 2024-09-26 RX ORDER — SODIUM CHLORIDE 0.9 % (FLUSH) 0.9 %
10 SYRINGE (ML) INJECTION
Status: DISCONTINUED | OUTPATIENT
Start: 2024-09-26 | End: 2024-09-26 | Stop reason: HOSPADM

## 2024-09-26 RX ORDER — DIPHENHYDRAMINE HYDROCHLORIDE 50 MG/ML
50 INJECTION INTRAMUSCULAR; INTRAVENOUS ONCE AS NEEDED
OUTPATIENT
Start: 2024-10-03

## 2024-09-26 RX ADMIN — METHYLPREDNISOLONE SODIUM SUCCINATE 80 MG: 125 INJECTION, POWDER, FOR SOLUTION INTRAMUSCULAR; INTRAVENOUS at 08:09

## 2024-09-26 RX ADMIN — SODIUM CHLORIDE 125 MG: 9 INJECTION, SOLUTION INTRAVENOUS at 08:09

## 2024-09-26 RX ADMIN — ACETAMINOPHEN 650 MG: 325 TABLET ORAL at 08:09

## 2024-09-26 NOTE — DISCHARGE INSTRUCTIONS
.[facility]  [address] [facility]  [address]  [phone] phone     [phone] fax  Hours of Operation: Monday- Friday 8:00am- 5:00pm  After hours phone  [phone]  Hematology / Oncology Physicians on call    Dr. Dean Ureña        Nurse Practitioners:    Baylee Redmond, JUDITH Rivas, JUDITH Hurt, JUDITH Zamora, JUDITH Light, PA      Please don't hesitate to call if you have any concerns.

## 2024-09-26 NOTE — NURSING
Patient report unable to stay for 30 minute observation time due to having another Doctor appt at 10:15 off [address].    Instructed patient on sign/symptoms of reactions and to report to ER if any issues occur.     Patient verbalizes understanding. No further questions or concerns at this time.

## 2024-09-26 NOTE — PLAN OF CARE
Problem: Adult Inpatient Plan of Care  Goal: Plan of Care Review  Outcome: Progressing  Flowsheets (Taken 9/26/2024 0924)  Plan of Care Reviewed With: patient  Goal: Patient-Specific Goal (Individualized)  Outcome: Progressing  Flowsheets (Taken 9/26/2024 0924)  Individualized Care Needs: patient likes feet elevated, warm blanket, and IV to left arm  Anxieties, Fears or Concerns: Patient reports feeling tired today  Patient/Family-Specific Goals (Include Timeframe): patient tolerated iron well with no adverse reactions.  Goal: Optimal Comfort and Wellbeing  Outcome: Progressing     Problem: Anemia  Goal: Anemia Symptom Improvement  Outcome: Progressing

## 2024-10-03 ENCOUNTER — INFUSION (OUTPATIENT)
Dept: INFUSION THERAPY | Facility: HOSPITAL | Age: 69
End: 2024-10-03
Attending: INTERNAL MEDICINE
Payer: MEDICARE

## 2024-10-03 VITALS
SYSTOLIC BLOOD PRESSURE: 139 MMHG | RESPIRATION RATE: 18 BRPM | OXYGEN SATURATION: 98 % | DIASTOLIC BLOOD PRESSURE: 77 MMHG | HEART RATE: 74 BPM | TEMPERATURE: 98 F

## 2024-10-03 DIAGNOSIS — D50.8 IRON DEFICIENCY ANEMIA SECONDARY TO INADEQUATE DIETARY IRON INTAKE: Primary | ICD-10-CM

## 2024-10-03 PROCEDURE — 96375 TX/PRO/DX INJ NEW DRUG ADDON: CPT

## 2024-10-03 PROCEDURE — 25000003 PHARM REV CODE 250: Performed by: INTERNAL MEDICINE

## 2024-10-03 PROCEDURE — 63600175 PHARM REV CODE 636 W HCPCS: Performed by: INTERNAL MEDICINE

## 2024-10-03 PROCEDURE — 96365 THER/PROPH/DIAG IV INF INIT: CPT

## 2024-10-03 RX ORDER — EPINEPHRINE 0.3 MG/.3ML
0.3 INJECTION SUBCUTANEOUS ONCE AS NEEDED
OUTPATIENT
Start: 2024-10-10

## 2024-10-03 RX ORDER — ACETAMINOPHEN 325 MG/1
650 TABLET ORAL
Status: COMPLETED | OUTPATIENT
Start: 2024-10-03 | End: 2024-10-03

## 2024-10-03 RX ORDER — METHYLPREDNISOLONE SOD SUCC 125 MG
80 VIAL (EA) INJECTION
Status: COMPLETED | OUTPATIENT
Start: 2024-10-03 | End: 2024-10-03

## 2024-10-03 RX ORDER — DIPHENHYDRAMINE HYDROCHLORIDE 50 MG/ML
50 INJECTION INTRAMUSCULAR; INTRAVENOUS ONCE AS NEEDED
OUTPATIENT
Start: 2024-10-10

## 2024-10-03 RX ORDER — SODIUM CHLORIDE 0.9 % (FLUSH) 0.9 %
10 SYRINGE (ML) INJECTION
OUTPATIENT
Start: 2024-10-10

## 2024-10-03 RX ORDER — METHYLPREDNISOLONE SOD SUCC 125 MG
80 VIAL (EA) INJECTION
Start: 2024-10-10

## 2024-10-03 RX ORDER — HEPARIN 100 UNIT/ML
500 SYRINGE INTRAVENOUS
OUTPATIENT
Start: 2024-10-10

## 2024-10-03 RX ORDER — ACETAMINOPHEN 325 MG/1
650 TABLET ORAL
Start: 2024-10-10

## 2024-10-03 RX ADMIN — ACETAMINOPHEN 650 MG: 325 TABLET ORAL at 08:10

## 2024-10-03 RX ADMIN — SODIUM CHLORIDE 125 MG: 9 INJECTION, SOLUTION INTRAVENOUS at 08:10

## 2024-10-03 RX ADMIN — METHYLPREDNISOLONE SODIUM SUCCINATE 80 MG: 125 INJECTION, POWDER, FOR SOLUTION INTRAMUSCULAR; INTRAVENOUS at 08:10

## 2024-10-03 NOTE — PLAN OF CARE
Problem: Adult Inpatient Plan of Care  Goal: Plan of Care Review  Outcome: Progressing  Flowsheets (Taken 10/3/2024 0807)  Plan of Care Reviewed With: patient  Goal: Optimal Comfort and Wellbeing  Outcome: Progressing  Intervention: Provide Person-Centered Care  Flowsheets (Taken 10/3/2024 0807)  Trust Relationship/Rapport:   care explained   choices provided   emotional support provided   empathic listening provided   questions answered   questions encouraged   reassurance provided   thoughts/feelings acknowledged

## 2024-10-09 ENCOUNTER — TELEPHONE (OUTPATIENT)
Dept: INFUSION THERAPY | Facility: HOSPITAL | Age: 69
End: 2024-10-09
Payer: MEDICARE

## 2024-10-17 ENCOUNTER — INFUSION (OUTPATIENT)
Dept: INFUSION THERAPY | Facility: HOSPITAL | Age: 69
End: 2024-10-17
Attending: INTERNAL MEDICINE
Payer: MEDICARE

## 2024-10-17 VITALS
RESPIRATION RATE: 18 BRPM | SYSTOLIC BLOOD PRESSURE: 131 MMHG | DIASTOLIC BLOOD PRESSURE: 79 MMHG | TEMPERATURE: 98 F | OXYGEN SATURATION: 98 % | HEART RATE: 79 BPM

## 2024-10-17 DIAGNOSIS — D50.8 IRON DEFICIENCY ANEMIA SECONDARY TO INADEQUATE DIETARY IRON INTAKE: Primary | ICD-10-CM

## 2024-10-17 PROCEDURE — 63600175 PHARM REV CODE 636 W HCPCS: Performed by: INTERNAL MEDICINE

## 2024-10-17 PROCEDURE — 96375 TX/PRO/DX INJ NEW DRUG ADDON: CPT

## 2024-10-17 PROCEDURE — 96365 THER/PROPH/DIAG IV INF INIT: CPT

## 2024-10-17 PROCEDURE — 25000003 PHARM REV CODE 250: Performed by: INTERNAL MEDICINE

## 2024-10-17 RX ORDER — METHYLPREDNISOLONE SOD SUCC 125 MG
80 VIAL (EA) INJECTION
Start: 2024-10-24

## 2024-10-17 RX ORDER — HEPARIN 100 UNIT/ML
500 SYRINGE INTRAVENOUS
OUTPATIENT
Start: 2024-10-24

## 2024-10-17 RX ORDER — METHYLPREDNISOLONE SOD SUCC 125 MG
80 VIAL (EA) INJECTION
Status: COMPLETED | OUTPATIENT
Start: 2024-10-17 | End: 2024-10-17

## 2024-10-17 RX ORDER — EPINEPHRINE 0.3 MG/.3ML
0.3 INJECTION SUBCUTANEOUS ONCE AS NEEDED
OUTPATIENT
Start: 2024-10-24

## 2024-10-17 RX ORDER — DIPHENHYDRAMINE HYDROCHLORIDE 50 MG/ML
50 INJECTION INTRAMUSCULAR; INTRAVENOUS ONCE AS NEEDED
OUTPATIENT
Start: 2024-10-24

## 2024-10-17 RX ORDER — SODIUM CHLORIDE 0.9 % (FLUSH) 0.9 %
10 SYRINGE (ML) INJECTION
OUTPATIENT
Start: 2024-10-24

## 2024-10-17 RX ORDER — ACETAMINOPHEN 325 MG/1
650 TABLET ORAL
Start: 2024-10-24

## 2024-10-17 RX ORDER — ACETAMINOPHEN 325 MG/1
650 TABLET ORAL
Status: COMPLETED | OUTPATIENT
Start: 2024-10-17 | End: 2024-10-17

## 2024-10-17 RX ADMIN — ACETAMINOPHEN 650 MG: 325 TABLET ORAL at 08:10

## 2024-10-17 RX ADMIN — METHYLPREDNISOLONE SODIUM SUCCINATE 80 MG: 125 INJECTION, POWDER, FOR SOLUTION INTRAMUSCULAR; INTRAVENOUS at 08:10

## 2024-10-17 RX ADMIN — SODIUM CHLORIDE 125 MG: 9 INJECTION, SOLUTION INTRAVENOUS at 08:10

## 2024-10-17 NOTE — PLAN OF CARE
Problem: Adult Inpatient Plan of Care  Goal: Plan of Care Review  Outcome: Progressing  Flowsheets (Taken 10/17/2024 0951)  Plan of Care Reviewed With: patient  Goal: Patient-Specific Goal (Individualized)  Outcome: Progressing  Goal: Optimal Comfort and Wellbeing  Outcome: Progressing  Intervention: Provide Person-Centered Care  Flowsheets (Taken 10/17/2024 0951)  Trust Relationship/Rapport:   care explained   choices provided   emotional support provided   empathic listening provided   questions answered   questions encouraged   reassurance provided   thoughts/feelings acknowledged

## 2024-10-24 ENCOUNTER — INFUSION (OUTPATIENT)
Dept: INFUSION THERAPY | Facility: HOSPITAL | Age: 69
End: 2024-10-24
Attending: INTERNAL MEDICINE
Payer: MEDICARE

## 2024-10-24 VITALS
RESPIRATION RATE: 16 BRPM | HEART RATE: 70 BPM | SYSTOLIC BLOOD PRESSURE: 114 MMHG | TEMPERATURE: 98 F | DIASTOLIC BLOOD PRESSURE: 70 MMHG | OXYGEN SATURATION: 97 %

## 2024-10-24 DIAGNOSIS — M81.0 OSTEOPOROSIS, UNSPECIFIED OSTEOPOROSIS TYPE, UNSPECIFIED PATHOLOGICAL FRACTURE PRESENCE: Primary | ICD-10-CM

## 2024-10-24 DIAGNOSIS — D50.8 IRON DEFICIENCY ANEMIA SECONDARY TO INADEQUATE DIETARY IRON INTAKE: Primary | ICD-10-CM

## 2024-10-24 DIAGNOSIS — Z51.81 MEDICATION MONITORING ENCOUNTER: ICD-10-CM

## 2024-10-24 PROCEDURE — 63600175 PHARM REV CODE 636 W HCPCS: Performed by: INTERNAL MEDICINE

## 2024-10-24 PROCEDURE — 25000003 PHARM REV CODE 250: Performed by: INTERNAL MEDICINE

## 2024-10-24 PROCEDURE — 96375 TX/PRO/DX INJ NEW DRUG ADDON: CPT

## 2024-10-24 PROCEDURE — 96365 THER/PROPH/DIAG IV INF INIT: CPT

## 2024-10-24 RX ORDER — HEPARIN 100 UNIT/ML
500 SYRINGE INTRAVENOUS
OUTPATIENT
Start: 2024-10-31

## 2024-10-24 RX ORDER — EPINEPHRINE 0.3 MG/.3ML
0.3 INJECTION SUBCUTANEOUS ONCE AS NEEDED
OUTPATIENT
Start: 2024-10-31

## 2024-10-24 RX ORDER — SODIUM CHLORIDE 0.9 % (FLUSH) 0.9 %
10 SYRINGE (ML) INJECTION
OUTPATIENT
Start: 2024-10-31

## 2024-10-24 RX ORDER — ACETAMINOPHEN 325 MG/1
650 TABLET ORAL
Start: 2024-10-31

## 2024-10-24 RX ORDER — METHYLPREDNISOLONE SOD SUCC 125 MG
80 VIAL (EA) INJECTION
Status: COMPLETED | OUTPATIENT
Start: 2024-10-24 | End: 2024-10-24

## 2024-10-24 RX ORDER — DIPHENHYDRAMINE HYDROCHLORIDE 50 MG/ML
50 INJECTION INTRAMUSCULAR; INTRAVENOUS ONCE AS NEEDED
OUTPATIENT
Start: 2024-10-31

## 2024-10-24 RX ORDER — ACETAMINOPHEN 325 MG/1
650 TABLET ORAL
Status: COMPLETED | OUTPATIENT
Start: 2024-10-24 | End: 2024-10-24

## 2024-10-24 RX ORDER — METHYLPREDNISOLONE SOD SUCC 125 MG
80 VIAL (EA) INJECTION
Start: 2024-10-31

## 2024-10-24 RX ADMIN — SODIUM CHLORIDE 125 MG: 9 INJECTION, SOLUTION INTRAVENOUS at 08:10

## 2024-10-24 RX ADMIN — METHYLPREDNISOLONE SODIUM SUCCINATE 80 MG: 125 INJECTION, POWDER, FOR SOLUTION INTRAMUSCULAR; INTRAVENOUS at 08:10

## 2024-10-24 RX ADMIN — ACETAMINOPHEN 650 MG: 325 TABLET ORAL at 08:10

## 2024-10-24 NOTE — PLAN OF CARE
Problem: Adult Inpatient Plan of Care  Goal: Plan of Care Review  Outcome: Progressing  Flowsheets (Taken 10/24/2024 0859)  Plan of Care Reviewed With: patient  Goal: Patient-Specific Goal (Individualized)  Outcome: Progressing  Flowsheets (Taken 10/24/2024 0859)  Individualized Care Needs: patient likes feet elevated, warm blanket, pillow and pillow under IV to left arm  Anxieties, Fears or Concerns: Patient feeling down today, teary eyed, had to put down her dog two days ago.  Patient/Family-Specific Goals (Include Timeframe): to tolerate infusion today  Goal: Optimal Comfort and Wellbeing  Outcome: Progressing  Intervention: Monitor Pain and Promote Comfort  Flowsheets (Taken 10/24/2024 0859)  Pain Management Interventions:   warm blanket provided   quiet environment facilitated   pillow support provided  Intervention: Provide Person-Centered Care  Flowsheets (Taken 10/24/2024 0859)  Trust Relationship/Rapport:   care explained   reassurance provided   choices provided   thoughts/feelings acknowledged   emotional support provided   empathic listening provided   questions answered   questions encouraged     Problem: Anemia  Goal: Anemia Symptom Improvement  Outcome: Progressing  Intervention: Monitor and Manage Anemia  Flowsheets (Taken 10/24/2024 0859)  Safety Promotion/Fall Prevention:   in recliner, wheels locked   instructed to call staff for mobility   lighting adjusted   medications reviewed  Fatigue Management: frequent rest breaks encouraged     Problem: Fall Injury Risk  Goal: Absence of Fall and Fall-Related Injury  Outcome: Progressing  Intervention: Identify and Manage Contributors  Flowsheets (Taken 10/24/2024 0859)  Self-Care Promotion: BADL personal objects within reach  Medication Review/Management:   medications reviewed   infusion initiated  Intervention: Promote Injury-Free Environment  Flowsheets (Taken 10/24/2024 0859)  Safety Promotion/Fall Prevention:   in recliner, wheels locked    instructed to call staff for mobility   lighting adjusted   medications reviewed

## 2024-10-24 NOTE — DISCHARGE INSTRUCTIONS
.[facility]  [address] [facility]  [address] [facility]  [phone] phone     [phone] fax  Hours of Operation: Monday- Friday 8:00am- 5:00pm  After hours phone  [phone]  Hematology / Oncology Physicians on call    Dr. Dean Dimas           Nurse Practitioners:     Char Michel, JUDITH Rivas, YESENIA Reyes, JUDITH Zamora, JUDITH Redmond, NP    Please don't hesitate to call if you have any concerns.      FALL PREVENTION   Falls often occur due to slipping, tripping or losing your balance. Here are ways to reduce your risk of falling again.   Was there anything that caused your fall that can be fixed, removed or replaced?   Make your home safe by keeping walkways clear of objects you may trip over.   Use non-slip pads under rugs.   Do not walk in poorly lit areas.   Do not stand on chairs or wobbly ladders.   Use caution when reaching overhead or looking upward. This position can cause a loss of balance.   Be sure your shoes fit properly, have non-slip bottoms and are in good condition.   Be cautious when going up and down stairs, curbs, and when walking on uneven sidewalks.   If your balance is poor, consider using a cane or walker.   If your fall was related to alcohol use, stop or limit alcohol intake.   If your fall was related to use of sleeping medicines, talk to your doctor about this. You may need to reduce your dosage at bedtime if you awaken during the night to go to the bathroom.   To reduce the need for nighttime bathroom trips:   Avoid drinking fluids for several hours before going to bed   Empty your bladder before going to bed   Men can keep a urinal at the bedside   © 6642-2374 [employer], [address] [place] [address], [place] [address]. All rights reserved. This information is not intended as a substitute for professional medical care. Always follow your healthcare  professional's instructions.    WAYS TO HELP PREVENT INFECTION        WASH YOUR HANDS OFTEN DURING THE DAY, ESPECIALLY BEFORE YOU EAT, AFTER USING THE BATHROOM, AND AFTER TOUCHING ANIMALS    STAY AWAY FROM PEOPLE WHO HAVE ILLNESSES YOU CAN CATCH; SUCH AS COLDS, FLU, CHICKEN POX    TRY TO AVOID CROWDS    STAY AWAY FROM CHILDREN WHO RECENTLY HAVE RECEIVED LIVE VIRUS VACCINES    MAINTAIN GOOD MOUTH CARE    DO NOT SQUEEZE OR SCRATCH PIMPLES    CLEAN CUTS & SCRAPES RIGHT AWAY AND DAILY UNTIL HEALED WITH WARM WATER, SOAP & AN ANTISEPTIC    AVOID CONTACT WITH LITTER BOXES, BIRD CAGES, & FISH TANKS    AVOID STANDING WATER, IE., BIRD BATHS, FLOWER POTS/VASES, OR HUMIDIFIERS    WEAR GLOVES WHEN GARDENING OR CLEANING UP AFTER OTHERS, ESPECIALLY BABIES & SMALL CHILDREN    DO NOT EAT RAW FISH, SEAFOOD, MEAT, OR EGGS

## 2024-10-31 ENCOUNTER — INFUSION (OUTPATIENT)
Dept: INFUSION THERAPY | Facility: HOSPITAL | Age: 69
End: 2024-10-31
Attending: INTERNAL MEDICINE
Payer: MEDICARE

## 2024-10-31 VITALS
RESPIRATION RATE: 17 BRPM | OXYGEN SATURATION: 97 % | DIASTOLIC BLOOD PRESSURE: 67 MMHG | TEMPERATURE: 98 F | SYSTOLIC BLOOD PRESSURE: 121 MMHG | HEART RATE: 62 BPM

## 2024-10-31 DIAGNOSIS — D50.8 IRON DEFICIENCY ANEMIA SECONDARY TO INADEQUATE DIETARY IRON INTAKE: Primary | ICD-10-CM

## 2024-10-31 PROCEDURE — 96375 TX/PRO/DX INJ NEW DRUG ADDON: CPT

## 2024-10-31 PROCEDURE — 63600175 PHARM REV CODE 636 W HCPCS: Performed by: INTERNAL MEDICINE

## 2024-10-31 PROCEDURE — 25000003 PHARM REV CODE 250: Performed by: INTERNAL MEDICINE

## 2024-10-31 PROCEDURE — 96365 THER/PROPH/DIAG IV INF INIT: CPT

## 2024-10-31 RX ORDER — SODIUM CHLORIDE 0.9 % (FLUSH) 0.9 %
10 SYRINGE (ML) INJECTION
Status: DISCONTINUED | OUTPATIENT
Start: 2024-10-31 | End: 2024-10-31 | Stop reason: HOSPADM

## 2024-10-31 RX ORDER — SODIUM CHLORIDE 0.9 % (FLUSH) 0.9 %
10 SYRINGE (ML) INJECTION
OUTPATIENT
Start: 2024-11-07

## 2024-10-31 RX ORDER — METHYLPREDNISOLONE SOD SUCC 125 MG
80 VIAL (EA) INJECTION
Status: COMPLETED | OUTPATIENT
Start: 2024-10-31 | End: 2024-10-31

## 2024-10-31 RX ORDER — DIPHENHYDRAMINE HYDROCHLORIDE 50 MG/ML
50 INJECTION INTRAMUSCULAR; INTRAVENOUS ONCE AS NEEDED
OUTPATIENT
Start: 2024-11-07

## 2024-10-31 RX ORDER — HEPARIN 100 UNIT/ML
500 SYRINGE INTRAVENOUS
OUTPATIENT
Start: 2024-11-07

## 2024-10-31 RX ORDER — METHYLPREDNISOLONE SOD SUCC 125 MG
80 VIAL (EA) INJECTION
Start: 2024-11-07

## 2024-10-31 RX ORDER — EPINEPHRINE 0.3 MG/.3ML
0.3 INJECTION SUBCUTANEOUS ONCE AS NEEDED
OUTPATIENT
Start: 2024-11-07

## 2024-10-31 RX ORDER — ACETAMINOPHEN 325 MG/1
650 TABLET ORAL
Status: COMPLETED | OUTPATIENT
Start: 2024-10-31 | End: 2024-10-31

## 2024-10-31 RX ORDER — ACETAMINOPHEN 325 MG/1
650 TABLET ORAL
Start: 2024-11-07

## 2024-10-31 RX ADMIN — SODIUM CHLORIDE 125 MG: 9 INJECTION, SOLUTION INTRAVENOUS at 08:10

## 2024-10-31 RX ADMIN — ACETAMINOPHEN 650 MG: 325 TABLET ORAL at 07:10

## 2024-10-31 RX ADMIN — METHYLPREDNISOLONE SODIUM SUCCINATE 80 MG: 125 INJECTION, POWDER, FOR SOLUTION INTRAMUSCULAR; INTRAVENOUS at 07:10

## 2024-11-07 ENCOUNTER — INFUSION (OUTPATIENT)
Dept: INFUSION THERAPY | Facility: HOSPITAL | Age: 69
End: 2024-11-07
Attending: INTERNAL MEDICINE
Payer: MEDICARE

## 2024-11-07 VITALS
TEMPERATURE: 98 F | DIASTOLIC BLOOD PRESSURE: 81 MMHG | OXYGEN SATURATION: 99 % | HEART RATE: 79 BPM | SYSTOLIC BLOOD PRESSURE: 132 MMHG | RESPIRATION RATE: 18 BRPM

## 2024-11-07 DIAGNOSIS — D50.8 IRON DEFICIENCY ANEMIA SECONDARY TO INADEQUATE DIETARY IRON INTAKE: Primary | ICD-10-CM

## 2024-11-07 PROCEDURE — 63600175 PHARM REV CODE 636 W HCPCS: Performed by: INTERNAL MEDICINE

## 2024-11-07 PROCEDURE — 96365 THER/PROPH/DIAG IV INF INIT: CPT

## 2024-11-07 PROCEDURE — 96375 TX/PRO/DX INJ NEW DRUG ADDON: CPT

## 2024-11-07 PROCEDURE — 25000003 PHARM REV CODE 250: Performed by: INTERNAL MEDICINE

## 2024-11-07 RX ORDER — EPINEPHRINE 0.3 MG/.3ML
0.3 INJECTION SUBCUTANEOUS ONCE AS NEEDED
OUTPATIENT
Start: 2024-11-14

## 2024-11-07 RX ORDER — METHYLPREDNISOLONE SOD SUCC 125 MG
80 VIAL (EA) INJECTION
Start: 2024-11-14

## 2024-11-07 RX ORDER — METHYLPREDNISOLONE SOD SUCC 125 MG
80 VIAL (EA) INJECTION
Status: COMPLETED | OUTPATIENT
Start: 2024-11-07 | End: 2024-11-07

## 2024-11-07 RX ORDER — DIPHENHYDRAMINE HYDROCHLORIDE 50 MG/ML
50 INJECTION INTRAMUSCULAR; INTRAVENOUS ONCE AS NEEDED
OUTPATIENT
Start: 2024-11-14

## 2024-11-07 RX ORDER — SODIUM CHLORIDE 0.9 % (FLUSH) 0.9 %
10 SYRINGE (ML) INJECTION
OUTPATIENT
Start: 2024-11-14

## 2024-11-07 RX ORDER — ACETAMINOPHEN 325 MG/1
650 TABLET ORAL
Start: 2024-11-14

## 2024-11-07 RX ORDER — ACETAMINOPHEN 325 MG/1
650 TABLET ORAL
Status: COMPLETED | OUTPATIENT
Start: 2024-11-07 | End: 2024-11-07

## 2024-11-07 RX ORDER — HEPARIN 100 UNIT/ML
500 SYRINGE INTRAVENOUS
OUTPATIENT
Start: 2024-11-14

## 2024-11-07 RX ADMIN — METHYLPREDNISOLONE SODIUM SUCCINATE 80 MG: 125 INJECTION, POWDER, FOR SOLUTION INTRAMUSCULAR; INTRAVENOUS at 08:11

## 2024-11-07 RX ADMIN — SODIUM CHLORIDE 125 MG: 9 INJECTION, SOLUTION INTRAVENOUS at 08:11

## 2024-11-07 RX ADMIN — ACETAMINOPHEN 650 MG: 325 TABLET ORAL at 08:11

## 2024-11-07 NOTE — PLAN OF CARE
Problem: Adult Inpatient Plan of Care  Goal: Plan of Care Review  Outcome: Progressing  Flowsheets (Taken 11/7/2024 0906)  Plan of Care Reviewed With: patient  Goal: Patient-Specific Goal (Individualized)  Outcome: Progressing  Flowsheets (Taken 11/7/2024 0906)  Individualized Care Needs: feet elevated, warm blanket  Anxieties, Fears or Concerns: no complaints  Goal: Optimal Comfort and Wellbeing  Outcome: Progressing  Intervention: Provide Person-Centered Care  Flowsheets (Taken 11/7/2024 0906)  Trust Relationship/Rapport:   care explained   choices provided   emotional support provided   empathic listening provided   questions answered   questions encouraged   reassurance provided   thoughts/feelings acknowledged

## 2024-12-05 ENCOUNTER — LAB VISIT (OUTPATIENT)
Dept: LAB | Facility: HOSPITAL | Age: 69
End: 2024-12-05
Attending: INTERNAL MEDICINE
Payer: MEDICARE

## 2024-12-05 DIAGNOSIS — D50.8 IRON DEFICIENCY ANEMIA SECONDARY TO INADEQUATE DIETARY IRON INTAKE: ICD-10-CM

## 2024-12-05 PROCEDURE — 82728 ASSAY OF FERRITIN: CPT | Performed by: INTERNAL MEDICINE

## 2024-12-05 PROCEDURE — 36415 COLL VENOUS BLD VENIPUNCTURE: CPT | Performed by: INTERNAL MEDICINE

## 2024-12-05 PROCEDURE — 85025 COMPLETE CBC W/AUTO DIFF WBC: CPT | Performed by: INTERNAL MEDICINE

## 2024-12-06 ENCOUNTER — OFFICE VISIT (OUTPATIENT)
Dept: HEMATOLOGY/ONCOLOGY | Facility: CLINIC | Age: 69
End: 2024-12-06
Payer: MEDICARE

## 2024-12-06 DIAGNOSIS — D50.8 IRON DEFICIENCY ANEMIA SECONDARY TO INADEQUATE DIETARY IRON INTAKE: Primary | ICD-10-CM

## 2024-12-06 LAB
BASOPHILS # BLD AUTO: 0.08 K/UL (ref 0–0.2)
BASOPHILS NFR BLD: 1 % (ref 0–1.9)
DIFFERENTIAL METHOD BLD: ABNORMAL
EOSINOPHIL # BLD AUTO: 0.2 K/UL (ref 0–0.5)
EOSINOPHIL NFR BLD: 2.1 % (ref 0–8)
ERYTHROCYTE [DISTWIDTH] IN BLOOD BY AUTOMATED COUNT: 17.7 % (ref 11.5–14.5)
FERRITIN SERPL-MCNC: 117 NG/ML (ref 20–300)
HCT VFR BLD AUTO: 37.2 % (ref 37–48.5)
HGB BLD-MCNC: 12.1 G/DL (ref 12–16)
IMM GRANULOCYTES # BLD AUTO: 0.06 K/UL (ref 0–0.04)
IMM GRANULOCYTES NFR BLD AUTO: 0.8 % (ref 0–0.5)
LYMPHOCYTES # BLD AUTO: 3.8 K/UL (ref 1–4.8)
LYMPHOCYTES NFR BLD: 46.9 % (ref 18–48)
MCH RBC QN AUTO: 30.3 PG (ref 27–31)
MCHC RBC AUTO-ENTMCNC: 32.5 G/DL (ref 32–36)
MCV RBC AUTO: 93 FL (ref 82–98)
MONOCYTES # BLD AUTO: 0.7 K/UL (ref 0.3–1)
MONOCYTES NFR BLD: 9.3 % (ref 4–15)
NEUTROPHILS # BLD AUTO: 3.2 K/UL (ref 1.8–7.7)
NEUTROPHILS NFR BLD: 39.9 % (ref 38–73)
NRBC BLD-RTO: 0 /100 WBC
PLATELET # BLD AUTO: 282 K/UL (ref 150–450)
PMV BLD AUTO: 9.4 FL (ref 9.2–12.9)
RBC # BLD AUTO: 3.99 M/UL (ref 4–5.4)
WBC # BLD AUTO: 8 K/UL (ref 3.9–12.7)

## 2024-12-06 NOTE — PROGRESS NOTES
The patient location is: home  Visit type: Virtual visit with synchronous audio and video  Face-to-face or time spent with patient on the encounter: 25 min  Total time spent on and for  this encounter which includes non face-to-face time preparing to see patient, review of tests, obtaining and or reviewing separately obtained records documenting clinical information in the electronic or other health records, independently interpreting results which is not separately reported ,and communicating results to the patient/family/caregiver and in care coordination and treatment planning/communicating with pharmacy for prescriptions/addressing social needs/arranging follow-up and or referrals : 25 min     Each patient I provide medical services by telemedicine is:  (1) informed of the relationship between the physician and patient and the respective role of any other health care provider with respect to management of the patient; and (2) notified that he or she may decline to receive medical services by telemedicine and may withdraw from such care at any time.  This is a video visit therefore some elements of the physical exam such as vital signs, heart sounds are breath sounds are not included and may be included if found in recent clinic notes of other providers assessing same patient. Any symptoms or signs that were visualized were stated by the patient may be included in this note.      Service Date:  12/6/24    Chief Complaint: iron deficiency anemia    Fe Irene is a 69 y.o. female here with iron deficiency anemia.  Recently had IV iron with 8 doses of Ferrlecit.  Felt more energy afterward.  Still having some fatigue.  Mourning the death of her dog.    Review of Systems   Constitutional: Negative.  Negative for appetite change and unexpected weight change.   HENT:  Positive for mouth sores.    Eyes: Negative.  Negative for visual disturbance.   Respiratory:  Positive for cough. Negative for shortness of  breath.    Cardiovascular: Negative.  Negative for chest pain.   Gastrointestinal: Negative.  Negative for abdominal pain and diarrhea.   Endocrine: Negative.    Genitourinary: Negative.  Negative for frequency.   Musculoskeletal:  Positive for back pain.   Integumentary:  Negative for rash. Negative.   Neurological:  Positive for headaches.   Hematological: Negative.  Negative for adenopathy.   Psychiatric/Behavioral: Negative.  The patient is not nervous/anxious.         Current Outpatient Medications   Medication Instructions    albuterol (PROVENTIL/VENTOLIN HFA) 90 mcg/actuation inhaler 2 puffs, Inhalation, Every 6 hours PRN    azithromycin (Z-NATALIA) 250 mg    b complex vitamins capsule No dose, route, or frequency recorded.    cetirizine (ZYRTEC) 10 mg, Daily    dextromethorphan (DELSYM 12 HOUR) 30 mg/5 mL liquid 5 milliliter  .    ipratropium (ATROVENT) 21 mcg (0.03 %) nasal spray USE 2 SPRAYS IN EACH NOSTRIL THREE TIMES DAILY AS NEEDED    losartan (COZAAR) 50 mg, Oral, Daily    rosuvastatin (CRESTOR) 10 mg, Oral, Nightly    traMADoL (ULTRAM) 50 mg tablet 1 tablet, Every 6 hours PRN    vitamin D (VITAMIN D3) 1000 units Tab 1 tablet, Every morning        Past Medical History:   Diagnosis Date    Asthma     Gastroesophageal reflux disease without esophagitis 2021    Hyperlipidemia 10/30/2023    Hypertension     Osteoporosis 10/30/2023        Past Surgical History:   Procedure Laterality Date    BREAST BIOPSY Left     benign    HYSTERECTOMY      SHOULDER SURGERY Bilateral     TONSILLECTOMY          Family History   Problem Relation Name Age of Onset    Breast cancer Daughter         Social History     Tobacco Use    Smoking status: Former     Current packs/day: 0.00     Types: Cigarettes     Quit date:      Years since quittin.9    Smokeless tobacco: Never   Substance Use Topics    Alcohol use: Yes     Comment: rare         There were no vitals filed for this visit.     Physical Exam:  There were no  vitals taken for this visit.    Physical Exam  Constitutional:       Appearance: Normal appearance.   HENT:      Head: Normocephalic and atraumatic.      Nose: Nose normal.      Mouth/Throat:      Mouth: Mucous membranes are moist.      Pharynx: Oropharynx is clear.   Eyes:      Conjunctiva/sclera: Conjunctivae normal.   Cardiovascular:      Rate and Rhythm: Normal rate and regular rhythm.      Heart sounds: Normal heart sounds.   Pulmonary:      Effort: Pulmonary effort is normal.      Breath sounds: Normal breath sounds.   Abdominal:      General: Abdomen is flat. Bowel sounds are normal.      Palpations: Abdomen is soft.   Musculoskeletal:         General: Normal range of motion.      Cervical back: Normal range of motion and neck supple.   Skin:     General: Skin is warm and dry.   Neurological:      General: No focal deficit present.      Mental Status: She is alert and oriented to person, place, and time. Mental status is at baseline.   Psychiatric:         Mood and Affect: Mood normal.          Labs:  Lab Results   Component Value Date    WBC 8.00 12/05/2024    RBC 3.99 (L) 12/05/2024    HGB 12.1 12/05/2024    HCT 37.2 12/05/2024    MCV 93 12/05/2024    MCH 30.3 12/05/2024    MCHC 32.5 12/05/2024    RDW 17.7 (H) 12/05/2024     12/05/2024    MPV 9.4 12/05/2024    GRAN 3.2 12/05/2024    GRAN 39.9 12/05/2024    LYMPH 3.8 12/05/2024    LYMPH 46.9 12/05/2024    MONO 0.7 12/05/2024    MONO 9.3 12/05/2024    EOS 0.2 12/05/2024    BASO 0.08 12/05/2024    EOSINOPHIL 2.1 12/05/2024    BASOPHIL 1.0 12/05/2024     Sodium   Date Value Ref Range Status   11/06/2024 138 134 - 144 mmol/L Final     Potassium   Date Value Ref Range Status   11/06/2024 4.7 3.5 - 5.2 mmol/L Final     Chloride   Date Value Ref Range Status   11/06/2024 102 96 - 106 mmol/L Final     CO2   Date Value Ref Range Status   11/06/2024 22 20 - 29 mmol/L Final     Glucose   Date Value Ref Range Status   11/06/2024 90 70 - 99 mg/dL Final     BUN    Date Value Ref Range Status   11/06/2024 10 8 - 27 mg/dL Final     Creatinine   Date Value Ref Range Status   11/06/2024 0.89 0.57 - 1.00 mg/dL Final     Calcium   Date Value Ref Range Status   11/06/2024 8.7 8.7 - 10.3 mg/dL Final     Total Protein   Date Value Ref Range Status   05/09/2024 7.7 6.0 - 8.4 g/dL Final     Albumin   Date Value Ref Range Status   11/06/2024 3.8 3.8 - 4.8 g/dL Final   05/09/2024 2.8 (L) 3.5 - 5.2 g/dL Final     Total Bilirubin   Date Value Ref Range Status   11/06/2024 0.3 0.0 - 1.2 mg/dL Final     Alkaline Phosphatase   Date Value Ref Range Status   05/09/2024 131 55 - 135 U/L Final     AST   Date Value Ref Range Status   11/06/2024 24 0 - 40 IU/L Final     ALT   Date Value Ref Range Status   11/06/2024 20 0 - 32 IU/L Final     Anion Gap   Date Value Ref Range Status   05/09/2024 8 8 - 16 mmol/L Final     eGFR if non    Date Value Ref Range Status   12/16/2021 69 >59 mL/min/1.73 Final       A/P:    Iron deficiency anemia  -may be due to gastric bypass but Cologuard test pending  -will get auth for Ferrlecit and give 8 doses  -RTC 4 weeks after with labs to check if improved      Aurash Khoobehi, MD  Hematology and Oncology

## 2025-05-15 ENCOUNTER — OFFICE VISIT (OUTPATIENT)
Dept: RHEUMATOLOGY | Facility: CLINIC | Age: 70
End: 2025-05-15
Payer: MEDICARE

## 2025-05-15 DIAGNOSIS — Z51.81 MEDICATION MONITORING ENCOUNTER: ICD-10-CM

## 2025-05-15 DIAGNOSIS — E55.9 VITAMIN D INSUFFICIENCY: ICD-10-CM

## 2025-05-15 DIAGNOSIS — M81.0 OSTEOPOROSIS, UNSPECIFIED OSTEOPOROSIS TYPE, UNSPECIFIED PATHOLOGICAL FRACTURE PRESENCE: Primary | ICD-10-CM

## 2025-05-15 DIAGNOSIS — Z71.89 COUNSELING ON HEALTH PROMOTION AND DISEASE PREVENTION: ICD-10-CM

## 2025-05-15 NOTE — PROGRESS NOTES
The patient location is: [place]  The chief complaint leading to consultation is: OP    Visit type: audiovisual    Face to Face time with patient: 30  30 minutes of total time spent on the encounter, which includes face to face time and non-face to face time preparing to see the patient (eg, review of tests), Obtaining and/or reviewing separately obtained history, Documenting clinical information in the electronic or other health record, Independently interpreting results (not separately reported) and communicating results to the patient/family/caregiver, or Care coordination (not separately reported).         Each patient to whom he or she provides medical services by telemedicine is:  (1) informed of the relationship between the physician and patient and the respective role of any other health care provider with respect to management of the patient; and (2) notified that he or she may decline to receive medical services by telemedicine and may withdraw from such care at any time.       RHEUMATOLOGY OUTPATIENT CLINIC NOTE    5/15/2025    Attending Rheumatologist: Beto Lan  Primary Care Provider/Physician Requesting Consultation: Dwight Galarza MD   Chief Complaint/Reason For Consultation:  No chief complaint on file.      Subjective:     Fe Irene is a 70 y.o. White female with OP    S/p Reclast #1 5/2024.  Tolerated w/o side effects.  No fractures over the past year.  S/p 2 dental implants few months ago.  Denies further upcoming invasive dental w/u.      Review of Systems   Constitutional:  Negative for fever.   Genitourinary:  Negative for dysuria, hematuria and urgency.   Musculoskeletal:  Negative for back pain, falls and joint pain.   Neurological:  Negative for focal weakness.       Chronic comorbid conditions affecting medical decision making today:  Past Medical History:   Diagnosis Date    Asthma     Gastroesophageal reflux disease without esophagitis 12/2/2021    Hyperlipidemia 10/30/2023     Hypertension     Osteoporosis 10/30/2023     Past Surgical History:   Procedure Laterality Date    BREAST BIOPSY Left     benign    HYSTERECTOMY      SHOULDER SURGERY Bilateral     TONSILLECTOMY       Family History   Problem Relation Name Age of Onset    Breast cancer Daughter       Tobacco Use History[1]  Current Medications[2]     Objective:     There were no vitals filed for this visit.  Physical Exam   Pulmonary/Chest: Effort normal.   Musculoskeletal:         General: No swelling or tenderness.   Skin: No rash noted.       Reviewed available old and all outside pertinent medical records available.    All lab results personally reviewed and interpreted by me.       ASSESSMENT / PLAN     1. Osteoporosis, unspecified osteoporosis type, unspecified pathological fracture presence  S/p Reclast #1-3 on 2024.  Plan to update DXA after third and last dose.  Plan to sign rx plan after reviewing no CI on recent labs.  PTH, Intact      2. Medication monitoring encounter  Comprehensive Metabolic Panel      3. Vitamin D insufficiency  Vitamin D level      4. Counseling on health promotion and disease prevention  Ca/vit D supp /400mg BID.              Visit today included increased complexity associated with the care of the episodic problem Osteoporosis addressed and managing the longitudinal care of the patient due to the serious and/or complex managed problem(s) Medication monitoring encounter, Counseling on health promotion and disease prevention.    Beto Lan M.D.           [1]   Social History  Tobacco Use   Smoking Status Former    Current packs/day: 0.00    Types: Cigarettes    Quit date:     Years since quittin.3   Smokeless Tobacco Never   [2]   Current Outpatient Medications:     albuterol (PROVENTIL/VENTOLIN HFA) 90 mcg/actuation inhaler, INHALE 2 PUFFS INTO LUNGS EVERY 6 HOURS AS NEEDED FOR WHEEZING, Disp: 18 g, Rfl: 0    azithromycin (Z-NATALIA) 250 MG tablet, Take 250 mg by mouth., Disp: ,  Rfl:     b complex vitamins capsule, , Disp: , Rfl:     cetirizine (ZYRTEC) 10 MG tablet, Take 10 mg by mouth once daily., Disp: , Rfl:     dextromethorphan (DELSYM 12 HOUR) 30 mg/5 mL liquid, 5 milliliter  ., Disp: , Rfl:     ipratropium (ATROVENT) 21 mcg (0.03 %) nasal spray, USE 2 SPRAYS IN EACH NOSTRIL THREE TIMES DAILY AS NEEDED, Disp: 30 mL, Rfl: 0    losartan (COZAAR) 50 MG tablet, Take 1 tablet (50 mg total) by mouth once daily., Disp: 90 tablet, Rfl: 3    rosuvastatin (CRESTOR) 10 MG tablet, Take 1 tablet (10 mg total) by mouth nightly., Disp: 90 tablet, Rfl: 3    traMADoL (ULTRAM) 50 mg tablet, Take 1 tablet by mouth every 6 (six) hours as needed., Disp: , Rfl:     vitamin D (VITAMIN D3) 1000 units Tab, Take 1 tablet by mouth every morning., Disp: , Rfl:

## 2025-05-21 ENCOUNTER — LAB VISIT (OUTPATIENT)
Dept: LAB | Facility: HOSPITAL | Age: 70
End: 2025-05-21
Attending: INTERNAL MEDICINE
Payer: MEDICARE

## 2025-05-21 DIAGNOSIS — M81.0 OSTEOPOROSIS, UNSPECIFIED OSTEOPOROSIS TYPE, UNSPECIFIED PATHOLOGICAL FRACTURE PRESENCE: ICD-10-CM

## 2025-05-21 DIAGNOSIS — Z51.81 MEDICATION MONITORING ENCOUNTER: ICD-10-CM

## 2025-05-21 DIAGNOSIS — E55.9 VITAMIN D INSUFFICIENCY: ICD-10-CM

## 2025-05-21 LAB
25(OH)D3+25(OH)D2 SERPL-MCNC: 29 NG/ML (ref 30–96)
ALBUMIN SERPL BCP-MCNC: 3.7 G/DL (ref 3.5–5.2)
ALP SERPL-CCNC: 107 UNIT/L (ref 40–150)
ALT SERPL W/O P-5'-P-CCNC: 44 UNIT/L (ref 10–44)
ANION GAP (OHS): 10 MMOL/L (ref 8–16)
AST SERPL-CCNC: 45 UNIT/L (ref 11–45)
BILIRUB SERPL-MCNC: 0.5 MG/DL (ref 0.1–1)
BUN SERPL-MCNC: 12 MG/DL (ref 8–23)
CALCIUM SERPL-MCNC: 9 MG/DL (ref 8.7–10.5)
CHLORIDE SERPL-SCNC: 106 MMOL/L (ref 95–110)
CO2 SERPL-SCNC: 24 MMOL/L (ref 23–29)
CREAT SERPL-MCNC: 1.1 MG/DL (ref 0.5–1.4)
GFR SERPLBLD CREATININE-BSD FMLA CKD-EPI: 53 ML/MIN/1.73/M2
GLUCOSE SERPL-MCNC: 102 MG/DL (ref 70–110)
POTASSIUM SERPL-SCNC: 5.2 MMOL/L (ref 3.5–5.1)
PROT SERPL-MCNC: 8.2 GM/DL (ref 6–8.4)
PTH-INTACT SERPL-MCNC: 176.1 PG/ML (ref 9–77)
SODIUM SERPL-SCNC: 140 MMOL/L (ref 136–145)

## 2025-05-21 PROCEDURE — 80053 COMPREHEN METABOLIC PANEL: CPT

## 2025-05-21 PROCEDURE — 36415 COLL VENOUS BLD VENIPUNCTURE: CPT

## 2025-05-21 PROCEDURE — 83970 ASSAY OF PARATHORMONE: CPT

## 2025-05-21 PROCEDURE — 82306 VITAMIN D 25 HYDROXY: CPT

## 2025-05-22 ENCOUNTER — INFUSION (OUTPATIENT)
Dept: INFUSION THERAPY | Facility: HOSPITAL | Age: 70
End: 2025-05-22
Attending: STUDENT IN AN ORGANIZED HEALTH CARE EDUCATION/TRAINING PROGRAM
Payer: MEDICARE

## 2025-05-22 VITALS
OXYGEN SATURATION: 98 % | SYSTOLIC BLOOD PRESSURE: 141 MMHG | DIASTOLIC BLOOD PRESSURE: 78 MMHG | RESPIRATION RATE: 16 BRPM | TEMPERATURE: 98 F | HEART RATE: 73 BPM

## 2025-05-22 DIAGNOSIS — M81.0 OSTEOPOROSIS, UNSPECIFIED OSTEOPOROSIS TYPE, UNSPECIFIED PATHOLOGICAL FRACTURE PRESENCE: Primary | ICD-10-CM

## 2025-05-22 PROCEDURE — 96374 THER/PROPH/DIAG INJ IV PUSH: CPT

## 2025-05-22 PROCEDURE — 63600175 PHARM REV CODE 636 W HCPCS: Performed by: INTERNAL MEDICINE

## 2025-05-22 RX ORDER — ZOLEDRONIC ACID 5 MG/100ML
5 INJECTION, SOLUTION INTRAVENOUS
Status: CANCELLED | OUTPATIENT
Start: 2025-05-22

## 2025-05-22 RX ORDER — ZOLEDRONIC ACID 5 MG/100ML
5 INJECTION, SOLUTION INTRAVENOUS
Status: COMPLETED | OUTPATIENT
Start: 2025-05-22 | End: 2025-05-22

## 2025-05-22 RX ORDER — ZOLEDRONIC ACID 5 MG/100ML
5 INJECTION, SOLUTION INTRAVENOUS
OUTPATIENT
Start: 2025-05-22

## 2025-05-22 RX ORDER — HEPARIN 100 UNIT/ML
500 SYRINGE INTRAVENOUS
Status: CANCELLED | OUTPATIENT
Start: 2025-05-22

## 2025-05-22 RX ORDER — SODIUM CHLORIDE 0.9 % (FLUSH) 0.9 %
10 SYRINGE (ML) INJECTION
OUTPATIENT
Start: 2025-05-22

## 2025-05-22 RX ORDER — SODIUM CHLORIDE 0.9 % (FLUSH) 0.9 %
10 SYRINGE (ML) INJECTION
Status: CANCELLED | OUTPATIENT
Start: 2025-05-22

## 2025-05-22 RX ORDER — HEPARIN 100 UNIT/ML
500 SYRINGE INTRAVENOUS
OUTPATIENT
Start: 2025-05-22

## 2025-05-22 RX ORDER — ACETAMINOPHEN 325 MG/1
650 TABLET ORAL
OUTPATIENT
Start: 2025-05-22

## 2025-05-22 RX ORDER — ACETAMINOPHEN 325 MG/1
650 TABLET ORAL
Status: CANCELLED | OUTPATIENT
Start: 2025-05-22

## 2025-05-22 RX ADMIN — ZOLEDRONIC ACID 5 MG: 5 INJECTION, SOLUTION INTRAVENOUS at 01:05

## 2025-05-22 NOTE — PLAN OF CARE
Last Reclast:  11/7/2024    Labs reviewed: Cr and Ca WNL    Patient denied any recent or upcoming dental work that would prevent pt from receiving Reclast today.    Patient stated she takes calcium and Vit D supplements at home:   Yes; encouraged patient to talk to Dr Lan about Vit D level.  Vit D lab low yesterday.    Patient tolerated Reclast well today; no adverse reaction noted.  No significant complaints voiced.  IV discontinued with catheter intact and pressure dressing applied to the site.  Has f/u appt(s) scheduled per MD request.  No questions or concerns voiced.  NAD noted upon discharge.

## 2025-05-22 NOTE — DISCHARGE INSTRUCTIONS
Thank you for letting me take care of YOU!!    [facility]  [address] [facility]  [id number] [place] [address] Drive  [phone] phone     [phone] fax  Hours of Operation: Monday- Friday 8:00am- 5:00pm  After hours phone  [phone]  Hematology / Oncology Physicians on call:    Dr. Dean Salter        Nurse Practitioners:    Char Michel, JUDITH Rivas, YESENIA Reyes, JUDITH Zamora, JUDITH Redmond, JUDITH      Please don't hesitate to call if you have any concerns.

## 2025-06-09 ENCOUNTER — LAB VISIT (OUTPATIENT)
Dept: LAB | Facility: HOSPITAL | Age: 70
End: 2025-06-09
Attending: INTERNAL MEDICINE
Payer: MEDICARE

## 2025-06-09 DIAGNOSIS — D50.8 IRON DEFICIENCY ANEMIA SECONDARY TO INADEQUATE DIETARY IRON INTAKE: ICD-10-CM

## 2025-06-09 LAB
ABSOLUTE EOSINOPHIL (OHS): 0.18 K/UL
ABSOLUTE MONOCYTE (OHS): 0.7 K/UL (ref 0.3–1)
ABSOLUTE NEUTROPHIL COUNT (OHS): 3.5 K/UL (ref 1.8–7.7)
BASOPHILS # BLD AUTO: 0.08 K/UL
BASOPHILS NFR BLD AUTO: 0.9 %
ERYTHROCYTE [DISTWIDTH] IN BLOOD BY AUTOMATED COUNT: 13.2 % (ref 11.5–14.5)
HCT VFR BLD AUTO: 42 % (ref 37–48.5)
HGB BLD-MCNC: 13.4 GM/DL (ref 12–16)
IMM GRANULOCYTES # BLD AUTO: 0.06 K/UL (ref 0–0.04)
IMM GRANULOCYTES NFR BLD AUTO: 0.7 % (ref 0–0.5)
LYMPHOCYTES # BLD AUTO: 4.42 K/UL (ref 1–4.8)
MCH RBC QN AUTO: 29.9 PG (ref 27–31)
MCHC RBC AUTO-ENTMCNC: 31.9 G/DL (ref 32–36)
MCV RBC AUTO: 94 FL (ref 82–98)
NUCLEATED RBC (/100WBC) (OHS): 0 /100 WBC
PLATELET # BLD AUTO: 428 K/UL (ref 150–450)
PMV BLD AUTO: 9.6 FL (ref 9.2–12.9)
RBC # BLD AUTO: 4.48 M/UL (ref 4–5.4)
RELATIVE EOSINOPHIL (OHS): 2 %
RELATIVE LYMPHOCYTE (OHS): 49.4 % (ref 18–48)
RELATIVE MONOCYTE (OHS): 7.8 % (ref 4–15)
RELATIVE NEUTROPHIL (OHS): 39.2 % (ref 38–73)
WBC # BLD AUTO: 8.94 K/UL (ref 3.9–12.7)

## 2025-06-09 PROCEDURE — 36415 COLL VENOUS BLD VENIPUNCTURE: CPT

## 2025-06-09 PROCEDURE — 85025 COMPLETE CBC W/AUTO DIFF WBC: CPT

## 2025-06-09 PROCEDURE — 82728 ASSAY OF FERRITIN: CPT

## 2025-06-10 LAB — FERRITIN SERPL-MCNC: 140 NG/ML (ref 20–300)

## 2025-06-11 ENCOUNTER — OFFICE VISIT (OUTPATIENT)
Dept: HEMATOLOGY/ONCOLOGY | Facility: CLINIC | Age: 70
End: 2025-06-11
Payer: MEDICARE

## 2025-06-11 DIAGNOSIS — D50.8 IRON DEFICIENCY ANEMIA SECONDARY TO INADEQUATE DIETARY IRON INTAKE: Primary | ICD-10-CM

## 2025-06-11 NOTE — PROGRESS NOTES
The patient location is: [place]  The chief complaint leading to consultation is: iron deficiency    Visit type: audiovisual    Face to Face time with patient: 10 mins  20 minutes of total time spent on the encounter, which includes face to face time and non-face to face time preparing to see the patient (eg, review of tests), Obtaining and/or reviewing separately obtained history, Documenting clinical information in the electronic or other health record, Independently interpreting results (not separately reported) and communicating results to the patient/family/caregiver, or Care coordination (not separately reported).         Each patient to whom he or she provides medical services by telemedicine is:  (1) informed of the relationship between the physician and patient and the respective role of any other health care provider with respect to management of the patient; and (2) notified that he or she may decline to receive medical services by telemedicine and may withdraw from such care at any time.      Service Date:  6/11/25    Chief Complaint: iron deficiency anemia    Fe Irene is a 70 y.o. female here with iron deficiency anemia.  Doing okay.  No complaints to me feels about the same she states      Review of Systems   Constitutional: Negative.  Negative for appetite change and unexpected weight change.   HENT:  Positive for mouth sores.    Eyes: Negative.  Negative for visual disturbance.   Respiratory:  Positive for cough. Negative for shortness of breath.    Cardiovascular: Negative.  Negative for chest pain.   Gastrointestinal: Negative.  Negative for abdominal pain and diarrhea.   Endocrine: Negative.    Genitourinary: Negative.  Negative for frequency.   Musculoskeletal:  Positive for back pain.   Integumentary:  Negative for rash. Negative.   Neurological:  Positive for headaches.   Hematological: Negative.  Negative for adenopathy.   Psychiatric/Behavioral: Negative.  The patient is not  nervous/anxious.         Current Outpatient Medications   Medication Instructions    albuterol (PROVENTIL/VENTOLIN HFA) 90 mcg/actuation inhaler 2 puffs, Inhalation, Every 6 hours PRN, INHALE 2 PUFFS INTO LUNGS EVERY 6 HOURS AS NEEDED FOR WHEEZING    azithromycin (Z-NATALIA) 250 mg    b complex vitamins capsule No dose, route, or frequency recorded.    cetirizine (ZYRTEC) 10 mg, Daily    dextromethorphan (DELSYM 12 HOUR) 30 mg/5 mL liquid 5 milliliter  .    ipratropium (ATROVENT) 21 mcg (0.03 %) nasal spray USE 2 SPRAYS IN EACH NOSTRIL THREE TIMES DAILY AS NEEDED    losartan (COZAAR) 50 mg, Oral, Daily    rosuvastatin (CRESTOR) 10 mg, Oral, Nightly    traMADoL (ULTRAM) 50 mg tablet 1 tablet, Every 6 hours PRN    vitamin D (VITAMIN D3) 1000 units Tab 1 tablet, Every morning        Past Medical History:   Diagnosis Date    Asthma     Gastroesophageal reflux disease without esophagitis 2021    Hyperlipidemia 10/30/2023    Hypertension     Osteoporosis 10/30/2023        Past Surgical History:   Procedure Laterality Date    BREAST BIOPSY Left     benign    HYSTERECTOMY      SHOULDER SURGERY Bilateral     TONSILLECTOMY          Family History   Problem Relation Name Age of Onset    Breast cancer Daughter         Social History     Tobacco Use    Smoking status: Former     Current packs/day: 0.00     Types: Cigarettes     Quit date:      Years since quittin.4    Smokeless tobacco: Never   Substance Use Topics    Alcohol use: Yes     Comment: rare         There were no vitals filed for this visit.     Physical Exam:  There were no vitals taken for this visit.    Physical Exam  Constitutional:       Appearance: Normal appearance.   HENT:      Head: Normocephalic and atraumatic.      Nose: Nose normal.      Mouth/Throat:      Mouth: Mucous membranes are moist.      Pharynx: Oropharynx is clear.   Eyes:      Conjunctiva/sclera: Conjunctivae normal.   Cardiovascular:      Rate and Rhythm: Normal rate and regular  rhythm.      Heart sounds: Normal heart sounds.   Pulmonary:      Effort: Pulmonary effort is normal.      Breath sounds: Normal breath sounds.   Abdominal:      General: Abdomen is flat. Bowel sounds are normal.      Palpations: Abdomen is soft.   Musculoskeletal:         General: Normal range of motion.      Cervical back: Normal range of motion and neck supple.   Skin:     General: Skin is warm and dry.   Neurological:      General: No focal deficit present.      Mental Status: She is alert and oriented to person, place, and time. Mental status is at baseline.   Psychiatric:         Mood and Affect: Mood normal.          Labs:  Lab Results   Component Value Date    WBC 8.94 06/09/2025    RBC 4.48 06/09/2025    HGB 13.4 06/09/2025    HCT 42.0 06/09/2025    MCV 94 06/09/2025    MCH 29.9 06/09/2025    MCHC 31.9 (L) 06/09/2025    RDW 13.2 06/09/2025     06/09/2025    MPV 9.6 06/09/2025    GRAN 3.2 12/05/2024    GRAN 39.9 12/05/2024    LYMPH 49.4 (H) 06/09/2025    LYMPH 4.42 06/09/2025    MONO 7.8 06/09/2025    MONO 0.70 06/09/2025    EOS 2.0 06/09/2025    EOS 0.18 06/09/2025    BASO 0.08 12/05/2024    EOSINOPHIL 2.1 12/05/2024    BASOPHIL 0.9 06/09/2025    BASOPHIL 0.08 06/09/2025     Sodium   Date Value Ref Range Status   05/21/2025 140 136 - 145 mmol/L Final   11/06/2024 138 134 - 144 mmol/L Final     Potassium   Date Value Ref Range Status   05/21/2025 5.2 (H) 3.5 - 5.1 mmol/L Final   11/06/2024 4.7 3.5 - 5.2 mmol/L Final     Chloride   Date Value Ref Range Status   05/21/2025 106 95 - 110 mmol/L Final   11/06/2024 102 96 - 106 mmol/L Final     CO2   Date Value Ref Range Status   05/21/2025 24 23 - 29 mmol/L Final   11/06/2024 22 20 - 29 mmol/L Final     Glucose   Date Value Ref Range Status   05/21/2025 102 70 - 110 mg/dL Final   11/06/2024 90 70 - 99 mg/dL Final     BUN   Date Value Ref Range Status   05/21/2025 12 8 - 23 mg/dL Final     Creatinine   Date Value Ref Range Status   05/21/2025 1.1 0.5 - 1.4  mg/dL Final     Calcium   Date Value Ref Range Status   05/21/2025 9.0 8.7 - 10.5 mg/dL Final   11/06/2024 8.7 8.7 - 10.3 mg/dL Final     Protein Total   Date Value Ref Range Status   05/21/2025 8.2 6.0 - 8.4 gm/dL Final     Total Protein   Date Value Ref Range Status   05/09/2024 7.7 6.0 - 8.4 g/dL Final     Albumin   Date Value Ref Range Status   05/21/2025 3.7 3.5 - 5.2 g/dL Final   05/09/2024 2.8 (L) 3.5 - 5.2 g/dL Final     Total Bilirubin   Date Value Ref Range Status   11/06/2024 0.3 0.0 - 1.2 mg/dL Final     Bilirubin Total   Date Value Ref Range Status   05/21/2025 0.5 0.1 - 1.0 mg/dL Final     Comment:     For infants and newborns, interpretation of results should be based   on gestational age, weight and in agreement with clinical   observations.    Premature Infant recommended reference ranges:   0-24 hours:  <8.0 mg/dL   24-48 hours: <12.0 mg/dL   3-5 days:    <15.0 mg/dL   6-29 days:   <15.0 mg/dL     Alkaline Phosphatase   Date Value Ref Range Status   05/09/2024 131 55 - 135 U/L Final     ALP   Date Value Ref Range Status   05/21/2025 107 40 - 150 unit/L Final     AST   Date Value Ref Range Status   05/21/2025 45 11 - 45 unit/L Final   11/06/2024 24 0 - 40 IU/L Final     ALT   Date Value Ref Range Status   05/21/2025 44 10 - 44 unit/L Final   11/06/2024 20 0 - 32 IU/L Final     Anion Gap   Date Value Ref Range Status   05/21/2025 10 8 - 16 mmol/L Final     eGFR if non    Date Value Ref Range Status   12/16/2021 69 >59 mL/min/1.73 Final       A/P:    Iron deficiency anemia  -due to gastric bypass causing malabsorption  -received IV iron in the past  -continue to check iron and will check it in 1 year      Aurash Khoobehi, MD  Hematology and Oncology

## 2025-09-03 ENCOUNTER — HOSPITAL ENCOUNTER (OUTPATIENT)
Dept: RADIOLOGY | Facility: HOSPITAL | Age: 70
Discharge: HOME | End: 2025-09-03
Attending: FAMILY MEDICINE
Payer: MEDICARE

## 2025-09-03 DIAGNOSIS — Z12.31 ENCOUNTER FOR SCREENING MAMMOGRAM FOR MALIGNANT NEOPLASM OF BREAST: ICD-10-CM

## 2025-09-03 PROCEDURE — 77067 SCR MAMMO BI INCL CAD: CPT | Mod: 26,,, | Performed by: STUDENT IN AN ORGANIZED HEALTH CARE EDUCATION/TRAINING PROGRAM

## 2025-09-03 PROCEDURE — 77063 BREAST TOMOSYNTHESIS BI: CPT | Mod: TC

## 2025-09-03 PROCEDURE — 77063 BREAST TOMOSYNTHESIS BI: CPT | Mod: 26,,, | Performed by: STUDENT IN AN ORGANIZED HEALTH CARE EDUCATION/TRAINING PROGRAM
